# Patient Record
Sex: MALE | Race: BLACK OR AFRICAN AMERICAN | Employment: FULL TIME | URBAN - METROPOLITAN AREA
[De-identification: names, ages, dates, MRNs, and addresses within clinical notes are randomized per-mention and may not be internally consistent; named-entity substitution may affect disease eponyms.]

---

## 2018-01-01 ENCOUNTER — APPOINTMENT (OUTPATIENT)
Dept: GENERAL RADIOLOGY | Age: 53
DRG: 064 | End: 2018-01-01
Attending: INTERNAL MEDICINE
Payer: COMMERCIAL

## 2018-01-01 ENCOUNTER — APPOINTMENT (OUTPATIENT)
Dept: GENERAL RADIOLOGY | Age: 53
End: 2018-01-01
Attending: EMERGENCY MEDICINE
Payer: COMMERCIAL

## 2018-01-01 ENCOUNTER — APPOINTMENT (OUTPATIENT)
Dept: CT IMAGING | Age: 53
DRG: 064 | End: 2018-01-01
Attending: INTERNAL MEDICINE
Payer: COMMERCIAL

## 2018-01-01 ENCOUNTER — HOSPITAL ENCOUNTER (EMERGENCY)
Age: 53
Discharge: HOME | End: 2018-08-19
Attending: EMERGENCY MEDICINE
Payer: COMMERCIAL

## 2018-01-01 ENCOUNTER — HOSPITAL ENCOUNTER (OUTPATIENT)
Dept: SLEEP MEDICINE | Age: 53
Discharge: HOME | End: 2018-10-26
Payer: COMMERCIAL

## 2018-01-01 ENCOUNTER — APPOINTMENT (OUTPATIENT)
Dept: GENERAL RADIOLOGY | Age: 53
DRG: 064 | End: 2018-01-01
Payer: COMMERCIAL

## 2018-01-01 ENCOUNTER — APPOINTMENT (OUTPATIENT)
Dept: CT IMAGING | Age: 53
DRG: 064 | End: 2018-01-01
Payer: COMMERCIAL

## 2018-01-01 ENCOUNTER — HOSPITAL ENCOUNTER (INPATIENT)
Age: 53
LOS: 3 days | DRG: 064 | End: 2018-11-05
Admitting: INTERNAL MEDICINE
Payer: COMMERCIAL

## 2018-01-01 ENCOUNTER — HOSPITAL ENCOUNTER (OUTPATIENT)
Dept: ULTRASOUND IMAGING | Age: 53
Discharge: HOME | End: 2018-06-18
Attending: INTERNAL MEDICINE
Payer: COMMERCIAL

## 2018-01-01 VITALS
WEIGHT: 315 LBS | TEMPERATURE: 94.4 F | SYSTOLIC BLOOD PRESSURE: 93 MMHG | OXYGEN SATURATION: 36 % | BODY MASS INDEX: 52.65 KG/M2 | DIASTOLIC BLOOD PRESSURE: 65 MMHG | RESPIRATION RATE: 27 BRPM

## 2018-01-01 VITALS
TEMPERATURE: 98 F | HEIGHT: 71 IN | HEART RATE: 68 BPM | BODY MASS INDEX: 44.1 KG/M2 | WEIGHT: 315 LBS | OXYGEN SATURATION: 96 % | SYSTOLIC BLOOD PRESSURE: 189 MMHG | DIASTOLIC BLOOD PRESSURE: 85 MMHG | RESPIRATION RATE: 16 BRPM

## 2018-01-01 DIAGNOSIS — N17.9 AKI (ACUTE KIDNEY INJURY) (HCC): ICD-10-CM

## 2018-01-01 DIAGNOSIS — I16.1 HYPERTENSIVE EMERGENCY: ICD-10-CM

## 2018-01-01 DIAGNOSIS — J96.02 ACUTE RESPIRATORY FAILURE WITH HYPOXIA AND HYPERCARBIA (HCC): ICD-10-CM

## 2018-01-01 DIAGNOSIS — I61.9 HEMORRHAGIC CEREBROVASCULAR ACCIDENT (CVA) (HCC): Primary | ICD-10-CM

## 2018-01-01 DIAGNOSIS — I61.9 HEMORRHAGIC STROKE (HCC): ICD-10-CM

## 2018-01-01 DIAGNOSIS — R73.9 HYPERGLYCEMIA: ICD-10-CM

## 2018-01-01 DIAGNOSIS — M25.552 PAIN OF LEFT HIP JOINT: Primary | ICD-10-CM

## 2018-01-01 DIAGNOSIS — N18.30 CHRONIC KIDNEY DISEASE, STAGE III (MODERATE) (HCC): ICD-10-CM

## 2018-01-01 DIAGNOSIS — I10 ESSENTIAL HYPERTENSION: ICD-10-CM

## 2018-01-01 DIAGNOSIS — R39.81 FUNCTIONAL URINARY INCONTINENCE: ICD-10-CM

## 2018-01-01 DIAGNOSIS — E66.01 MORBID OBESITY (HCC): ICD-10-CM

## 2018-01-01 DIAGNOSIS — Z99.11 ENCOUNTER FOR WEANING FROM VENTILATOR (HCC): ICD-10-CM

## 2018-01-01 DIAGNOSIS — J96.01 ACUTE RESPIRATORY FAILURE WITH HYPOXIA AND HYPERCARBIA (HCC): ICD-10-CM

## 2018-01-01 LAB
ALBUMIN SERPL-MCNC: 2.4 G/DL (ref 3.5–5)
ALBUMIN SERPL-MCNC: 3.7 G/DL (ref 3.5–5)
ALBUMIN/GLOB SERPL: 0.6 {RATIO} (ref 1.2–3.5)
ALBUMIN/GLOB SERPL: 0.9 {RATIO} (ref 1.2–3.5)
ALP SERPL-CCNC: 62 U/L (ref 50–136)
ALP SERPL-CCNC: 78 U/L (ref 50–136)
ALT SERPL-CCNC: 16 U/L (ref 12–65)
ALT SERPL-CCNC: 25 U/L (ref 12–65)
AMPHET UR QL SCN: NEGATIVE
ANION GAP SERPL CALC-SCNC: 10 MMOL/L (ref 7–16)
ANION GAP SERPL CALC-SCNC: 8 MMOL/L (ref 7–16)
ANION GAP SERPL CALC-SCNC: 8 MMOL/L (ref 7–16)
ANION GAP SERPL CALC-SCNC: 9 MMOL/L (ref 7–16)
ANION GAP SERPL CALC-SCNC: 9 MMOL/L (ref 7–16)
APTT PPP: 29.6 SEC (ref 23.2–35.3)
APTT PPP: 31.3 SEC (ref 23.2–35.3)
ARTERIAL PATENCY WRIST A: NO
ARTERIAL PATENCY WRIST A: YES
AST SERPL-CCNC: 12 U/L (ref 15–37)
AST SERPL-CCNC: 13 U/L (ref 15–37)
ATRIAL RATE: 64 BPM
BARBITURATES UR QL SCN: NEGATIVE
BASE EXCESS BLD CALC-SCNC: 0 MMOL/L
BASE EXCESS BLD CALC-SCNC: 0 MMOL/L
BASE EXCESS BLD CALC-SCNC: 1 MMOL/L
BASE EXCESS BLD CALC-SCNC: 1 MMOL/L
BASE EXCESS BLD CALC-SCNC: 2 MMOL/L
BASE EXCESS BLD CALC-SCNC: 3 MMOL/L
BASE EXCESS BLD CALC-SCNC: 4 MMOL/L
BASOPHILS # BLD: 0 K/UL (ref 0–0.2)
BASOPHILS # BLD: 0 K/UL (ref 0–0.2)
BASOPHILS NFR BLD: 0 % (ref 0–2)
BASOPHILS NFR BLD: 1 % (ref 0–2)
BDY SITE: ABNORMAL
BENZODIAZ UR QL: NEGATIVE
BILIRUB SERPL-MCNC: 0.4 MG/DL (ref 0.2–1.1)
BILIRUB SERPL-MCNC: 1.9 MG/DL (ref 0.2–1.1)
BNP SERPL-MCNC: 103 PG/ML
BODY TEMPERATURE: 98.6
BUN SERPL-MCNC: 22 MG/DL (ref 6–23)
BUN SERPL-MCNC: 26 MG/DL (ref 6–23)
BUN SERPL-MCNC: 35 MG/DL (ref 6–23)
BUN SERPL-MCNC: 43 MG/DL (ref 6–23)
BUN SERPL-MCNC: 47 MG/DL (ref 6–23)
CALCIUM SERPL-MCNC: 7.8 MG/DL (ref 8.3–10.4)
CALCIUM SERPL-MCNC: 8 MG/DL (ref 8.3–10.4)
CALCIUM SERPL-MCNC: 8.2 MG/DL (ref 8.3–10.4)
CALCIUM SERPL-MCNC: 8.3 MG/DL (ref 8.3–10.4)
CALCIUM SERPL-MCNC: 8.5 MG/DL (ref 8.3–10.4)
CALCULATED P AXIS, ECG09: 52 DEGREES
CALCULATED R AXIS, ECG10: -44 DEGREES
CALCULATED T AXIS, ECG11: 89 DEGREES
CANNABINOIDS UR QL SCN: NEGATIVE
CHLORIDE SERPL-SCNC: 105 MMOL/L (ref 98–107)
CHLORIDE SERPL-SCNC: 106 MMOL/L (ref 98–107)
CHLORIDE SERPL-SCNC: 106 MMOL/L (ref 98–107)
CHLORIDE SERPL-SCNC: 107 MMOL/L (ref 98–107)
CHLORIDE SERPL-SCNC: 115 MMOL/L (ref 98–107)
CO2 BLD-SCNC: 25 MMOL/L
CO2 BLD-SCNC: 25 MMOL/L
CO2 BLD-SCNC: 26 MMOL/L
CO2 BLD-SCNC: 28 MMOL/L
CO2 BLD-SCNC: 28 MMOL/L
CO2 BLD-SCNC: 29 MMOL/L
CO2 BLD-SCNC: 29 MMOL/L
CO2 BLD-SCNC: 30 MMOL/L
CO2 BLD-SCNC: 30 MMOL/L
CO2 BLD-SCNC: 31 MMOL/L
CO2 SERPL-SCNC: 26 MMOL/L (ref 21–32)
CO2 SERPL-SCNC: 26 MMOL/L (ref 21–32)
CO2 SERPL-SCNC: 27 MMOL/L (ref 21–32)
CO2 SERPL-SCNC: 27 MMOL/L (ref 21–32)
CO2 SERPL-SCNC: 28 MMOL/L (ref 21–32)
COCAINE UR QL SCN: NEGATIVE
CREAT SERPL-MCNC: 2.59 MG/DL (ref 0.8–1.5)
CREAT SERPL-MCNC: 3.09 MG/DL (ref 0.8–1.5)
CREAT SERPL-MCNC: 3.56 MG/DL (ref 0.8–1.5)
CREAT SERPL-MCNC: 3.99 MG/DL (ref 0.8–1.5)
CREAT SERPL-MCNC: 4.07 MG/DL (ref 0.8–1.5)
DIAGNOSIS, 93000: NORMAL
DIFFERENTIAL METHOD BLD: ABNORMAL
DIFFERENTIAL METHOD BLD: NORMAL
EOSINOPHIL # BLD: 0.1 K/UL (ref 0–0.8)
EOSINOPHIL # BLD: 0.2 K/UL (ref 0–0.8)
EOSINOPHIL NFR BLD: 0 % (ref 0.5–7.8)
EOSINOPHIL NFR BLD: 3 % (ref 0.5–7.8)
ERYTHROCYTE [DISTWIDTH] IN BLOOD BY AUTOMATED COUNT: 13.9 %
ERYTHROCYTE [DISTWIDTH] IN BLOOD BY AUTOMATED COUNT: 14.4 %
ERYTHROCYTE [DISTWIDTH] IN BLOOD BY AUTOMATED COUNT: 14.6 %
ERYTHROCYTE [DISTWIDTH] IN BLOOD BY AUTOMATED COUNT: 14.6 %
EST. AVERAGE GLUCOSE BLD GHB EST-MCNC: 126 MG/DL
GAS FLOW.O2 O2 DELIVERY SYS: ABNORMAL L/MIN
GAS FLOW.O2 SETTING OXYMISER: 12 BPM
GAS FLOW.O2 SETTING OXYMISER: 14 BPM
GAS FLOW.O2 SETTING OXYMISER: 14 BPM
GAS FLOW.O2 SETTING OXYMISER: 18 BPM
GAS FLOW.O2 SETTING OXYMISER: 18 BPM
GAS FLOW.O2 SETTING OXYMISER: 22 BPM
GAS FLOW.O2 SETTING OXYMISER: 24 BPM
GAS FLOW.O2 SETTING OXYMISER: 24 BPM
GLOBULIN SER CALC-MCNC: 4.1 G/DL (ref 2.3–3.5)
GLOBULIN SER CALC-MCNC: 4.2 G/DL (ref 2.3–3.5)
GLUCOSE BLD STRIP.AUTO-MCNC: 105 MG/DL (ref 65–100)
GLUCOSE BLD STRIP.AUTO-MCNC: 108 MG/DL (ref 65–100)
GLUCOSE BLD STRIP.AUTO-MCNC: 108 MG/DL (ref 65–100)
GLUCOSE BLD STRIP.AUTO-MCNC: 120 MG/DL (ref 65–100)
GLUCOSE BLD STRIP.AUTO-MCNC: 120 MG/DL (ref 65–100)
GLUCOSE BLD STRIP.AUTO-MCNC: 128 MG/DL (ref 65–100)
GLUCOSE BLD STRIP.AUTO-MCNC: 129 MG/DL (ref 65–100)
GLUCOSE BLD STRIP.AUTO-MCNC: 131 MG/DL (ref 65–100)
GLUCOSE BLD STRIP.AUTO-MCNC: 139 MG/DL (ref 65–100)
GLUCOSE BLD STRIP.AUTO-MCNC: 147 MG/DL (ref 65–100)
GLUCOSE BLD STRIP.AUTO-MCNC: 148 MG/DL (ref 65–100)
GLUCOSE BLD STRIP.AUTO-MCNC: 85 MG/DL (ref 65–100)
GLUCOSE BLD STRIP.AUTO-MCNC: 99 MG/DL (ref 65–100)
GLUCOSE SERPL-MCNC: 103 MG/DL (ref 65–100)
GLUCOSE SERPL-MCNC: 135 MG/DL (ref 65–100)
GLUCOSE SERPL-MCNC: 137 MG/DL (ref 65–100)
GLUCOSE SERPL-MCNC: 155 MG/DL (ref 65–100)
GLUCOSE SERPL-MCNC: 90 MG/DL (ref 65–100)
HBA1C MFR BLD: 6 % (ref 4.8–6)
HCO3 BLD-SCNC: 23.7 MMOL/L (ref 22–26)
HCO3 BLD-SCNC: 23.7 MMOL/L (ref 22–26)
HCO3 BLD-SCNC: 24.6 MMOL/L (ref 22–26)
HCO3 BLD-SCNC: 26.7 MMOL/L (ref 22–26)
HCO3 BLD-SCNC: 27.1 MMOL/L (ref 22–26)
HCO3 BLD-SCNC: 27.3 MMOL/L (ref 22–26)
HCO3 BLD-SCNC: 27.9 MMOL/L (ref 22–26)
HCO3 BLD-SCNC: 28 MMOL/L (ref 22–26)
HCO3 BLD-SCNC: 28.4 MMOL/L (ref 22–26)
HCO3 BLD-SCNC: 28.7 MMOL/L (ref 22–26)
HCT VFR BLD AUTO: 40.8 % (ref 41.1–50.3)
HCT VFR BLD AUTO: 41.5 % (ref 41.1–50.3)
HCT VFR BLD AUTO: 44.8 % (ref 41.1–50.3)
HCT VFR BLD AUTO: 47.4 % (ref 41.1–50.3)
HGB BLD-MCNC: 13 G/DL (ref 13.6–17.2)
HGB BLD-MCNC: 13.3 G/DL (ref 13.6–17.2)
HGB BLD-MCNC: 14.5 G/DL (ref 13.6–17.2)
HGB BLD-MCNC: 15.1 G/DL (ref 13.6–17.2)
IMM GRANULOCYTES # BLD: 0 K/UL (ref 0–0.5)
IMM GRANULOCYTES # BLD: 0.1 K/UL (ref 0–0.5)
IMM GRANULOCYTES NFR BLD AUTO: 0 % (ref 0–5)
IMM GRANULOCYTES NFR BLD AUTO: 0 % (ref 0–5)
INR PPP: 1.1
INR PPP: 1.3
INR PPP: 1.3
INSPIRATION.DURATION SETTING TIME VENT: 0.9 SEC
INSPIRATION.DURATION SETTING TIME VENT: 1.3 SEC
INSPIRATION.DURATION SETTING TIME VENT: 1.3 SEC
LYMPHOCYTES # BLD: 1.1 K/UL (ref 0.5–4.6)
LYMPHOCYTES # BLD: 2.3 K/UL (ref 0.5–4.6)
LYMPHOCYTES NFR BLD: 32 % (ref 13–44)
LYMPHOCYTES NFR BLD: 9 % (ref 13–44)
MAGNESIUM SERPL-MCNC: 2.1 MG/DL (ref 1.8–2.4)
MAGNESIUM SERPL-MCNC: 2.1 MG/DL (ref 1.8–2.4)
MAGNESIUM SERPL-MCNC: 2.2 MG/DL (ref 1.8–2.4)
MCH RBC QN AUTO: 29.1 PG (ref 26.1–32.9)
MCH RBC QN AUTO: 29.6 PG (ref 26.1–32.9)
MCH RBC QN AUTO: 29.7 PG (ref 26.1–32.9)
MCH RBC QN AUTO: 29.8 PG (ref 26.1–32.9)
MCHC RBC AUTO-ENTMCNC: 31.9 G/DL (ref 31.4–35)
MCHC RBC AUTO-ENTMCNC: 31.9 G/DL (ref 31.4–35)
MCHC RBC AUTO-ENTMCNC: 32 G/DL (ref 31.4–35)
MCHC RBC AUTO-ENTMCNC: 32.4 G/DL (ref 31.4–35)
MCV RBC AUTO: 91.4 FL (ref 79.6–97.8)
MCV RBC AUTO: 91.5 FL (ref 79.6–97.8)
MCV RBC AUTO: 92.6 FL (ref 79.6–97.8)
MCV RBC AUTO: 93.5 FL (ref 79.6–97.8)
METHADONE UR QL: NEGATIVE
MM INDURATION POC: 0 MM (ref 0–5)
MM INDURATION POC: 0 MM (ref 0–5)
MM INDURATION POC: NORMAL MM (ref 0–5)
MONOCYTES # BLD: 0.6 K/UL (ref 0.1–1.3)
MONOCYTES # BLD: 0.9 K/UL (ref 0.1–1.3)
MONOCYTES NFR BLD: 7 % (ref 4–12)
MONOCYTES NFR BLD: 8 % (ref 4–12)
NEUTS SEG # BLD: 10.3 K/UL (ref 1.7–8.2)
NEUTS SEG # BLD: 4 K/UL (ref 1.7–8.2)
NEUTS SEG NFR BLD: 56 % (ref 43–78)
NEUTS SEG NFR BLD: 83 % (ref 43–78)
NRBC # BLD: 0 K/UL (ref 0–0.2)
O2/TOTAL GAS SETTING VFR VENT: 100 %
O2/TOTAL GAS SETTING VFR VENT: 21 %
O2/TOTAL GAS SETTING VFR VENT: 45 %
O2/TOTAL GAS SETTING VFR VENT: 60 %
O2/TOTAL GAS SETTING VFR VENT: 70 %
OPIATES UR QL: POSITIVE
P-R INTERVAL, ECG05: 212 MS
PCO2 BLD: 28.4 MMHG (ref 35–45)
PCO2 BLD: 30.1 MMHG (ref 35–45)
PCO2 BLD: 33.1 MMHG (ref 35–45)
PCO2 BLD: 34.2 MMHG (ref 35–45)
PCO2 BLD: 40.9 MMHG (ref 35–45)
PCO2 BLD: 44.6 MMHG (ref 35–45)
PCO2 BLD: 49.2 MMHG (ref 35–45)
PCO2 BLD: 54.5 MMHG (ref 35–45)
PCO2 BLD: 60.4 MMHG (ref 35–45)
PCO2 BLD: 62.2 MMHG (ref 35–45)
PCP UR QL: NEGATIVE
PEEP RESPIRATORY: 12 CMH2O
PEEP RESPIRATORY: 14 CMH2O
PEEP RESPIRATORY: 8 CMH2O
PEEP RESPIRATORY: 8 CMH2O
PH BLD: 7.27 [PH] (ref 7.35–7.45)
PH BLD: 7.27 [PH] (ref 7.35–7.45)
PH BLD: 7.33 [PH] (ref 7.35–7.45)
PH BLD: 7.36 [PH] (ref 7.35–7.45)
PH BLD: 7.39 [PH] (ref 7.35–7.45)
PH BLD: 7.43 [PH] (ref 7.35–7.45)
PH BLD: 7.48 [PH] (ref 7.35–7.45)
PH BLD: 7.5 [PH] (ref 7.35–7.45)
PH BLD: 7.5 [PH] (ref 7.35–7.45)
PH BLD: 7.53 [PH] (ref 7.35–7.45)
PHOSPHATE SERPL-MCNC: 1.6 MG/DL (ref 2.5–4.5)
PHOSPHATE SERPL-MCNC: 3.9 MG/DL (ref 2.5–4.5)
PLATELET # BLD AUTO: 177 K/UL (ref 150–450)
PLATELET # BLD AUTO: 210 K/UL (ref 150–450)
PLATELET # BLD AUTO: 226 K/UL (ref 150–450)
PLATELET # BLD AUTO: 232 K/UL (ref 150–450)
PMV BLD AUTO: 11.2 FL (ref 9.4–12.3)
PMV BLD AUTO: 11.2 FL (ref 9.4–12.3)
PMV BLD AUTO: 11.6 FL (ref 9.4–12.3)
PMV BLD AUTO: 11.7 FL (ref 9.4–12.3)
PO2 BLD: 102 MMHG (ref 75–100)
PO2 BLD: 125 MMHG (ref 75–100)
PO2 BLD: 196 MMHG (ref 75–100)
PO2 BLD: 46 MMHG (ref 75–100)
PO2 BLD: 68 MMHG (ref 75–100)
PO2 BLD: 76 MMHG (ref 75–100)
PO2 BLD: 76 MMHG (ref 75–100)
PO2 BLD: 78 MMHG (ref 75–100)
PO2 BLD: 80 MMHG (ref 75–100)
PO2 BLD: 99 MMHG (ref 75–100)
POTASSIUM SERPL-SCNC: 3.5 MMOL/L (ref 3.5–5.1)
POTASSIUM SERPL-SCNC: 3.8 MMOL/L (ref 3.5–5.1)
POTASSIUM SERPL-SCNC: 3.9 MMOL/L (ref 3.5–5.1)
PPD POC: NEGATIVE NEGATIVE
PPD POC: NORMAL NEGATIVE
PPD POC: NORMAL NEGATIVE
PROCALCITONIN SERPL-MCNC: 0.1 NG/ML
PROT SERPL-MCNC: 6.5 G/DL (ref 6.3–8.2)
PROT SERPL-MCNC: 7.9 G/DL (ref 6.3–8.2)
PROTHROMBIN TIME: 13.4 SEC (ref 11.5–14.5)
PROTHROMBIN TIME: 15.3 SEC (ref 11.5–14.5)
PROTHROMBIN TIME: 15.4 SEC (ref 11.5–14.5)
Q-T INTERVAL, ECG07: 434 MS
QRS DURATION, ECG06: 102 MS
QTC CALCULATION (BEZET), ECG08: 447 MS
RBC # BLD AUTO: 4.46 M/UL (ref 4.23–5.6)
RBC # BLD AUTO: 4.48 M/UL (ref 4.23–5.6)
RBC # BLD AUTO: 4.9 M/UL (ref 4.23–5.6)
RBC # BLD AUTO: 5.07 M/UL (ref 4.23–5.6)
SAO2 % BLD: 100 % (ref 95–98)
SAO2 % BLD: 74 % (ref 95–98)
SAO2 % BLD: 94 % (ref 95–98)
SAO2 % BLD: 95 % (ref 95–98)
SAO2 % BLD: 96 % (ref 95–98)
SAO2 % BLD: 96 % (ref 95–98)
SAO2 % BLD: 97 % (ref 95–98)
SAO2 % BLD: 99 % (ref 95–98)
SERVICE CMNT-IMP: ABNORMAL
SODIUM SERPL-SCNC: 140 MMOL/L (ref 136–145)
SODIUM SERPL-SCNC: 142 MMOL/L (ref 136–145)
SODIUM SERPL-SCNC: 142 MMOL/L (ref 136–145)
SODIUM SERPL-SCNC: 143 MMOL/L (ref 136–145)
SODIUM SERPL-SCNC: 150 MMOL/L (ref 136–145)
SPECIMEN TYPE: ABNORMAL
VENTILATION MODE VENT: ABNORMAL
VENTRICULAR RATE, ECG03: 64 BPM
VT SETTING VENT: 500 ML
VT SETTING VENT: 510 ML
WBC # BLD AUTO: 12.5 K/UL (ref 4.3–11.1)
WBC # BLD AUTO: 14.1 K/UL (ref 4.3–11.1)
WBC # BLD AUTO: 7.3 K/UL (ref 4.3–11.1)
WBC # BLD AUTO: 8.2 K/UL (ref 4.3–11.1)

## 2018-01-01 PROCEDURE — 80053 COMPREHEN METABOLIC PANEL: CPT

## 2018-01-01 PROCEDURE — 80048 BASIC METABOLIC PNL TOTAL CA: CPT

## 2018-01-01 PROCEDURE — 71045 X-RAY EXAM CHEST 1 VIEW: CPT

## 2018-01-01 PROCEDURE — 36415 COLL VENOUS BLD VENIPUNCTURE: CPT

## 2018-01-01 PROCEDURE — 74011250637 HC RX REV CODE- 250/637: Performed by: INTERNAL MEDICINE

## 2018-01-01 PROCEDURE — 74011000250 HC RX REV CODE- 250: Performed by: INTERNAL MEDICINE

## 2018-01-01 PROCEDURE — 05HM33Z INSERTION OF INFUSION DEVICE INTO RIGHT INTERNAL JUGULAR VEIN, PERCUTANEOUS APPROACH: ICD-10-PCS | Performed by: INTERNAL MEDICINE

## 2018-01-01 PROCEDURE — 36600 WITHDRAWAL OF ARTERIAL BLOOD: CPT

## 2018-01-01 PROCEDURE — 65610000001 HC ROOM ICU GENERAL

## 2018-01-01 PROCEDURE — 74011000258 HC RX REV CODE- 258: Performed by: INTERNAL MEDICINE

## 2018-01-01 PROCEDURE — 83735 ASSAY OF MAGNESIUM: CPT

## 2018-01-01 PROCEDURE — 99285 EMERGENCY DEPT VISIT HI MDM: CPT

## 2018-01-01 PROCEDURE — C1751 CATH, INF, PER/CENT/MIDLINE: HCPCS

## 2018-01-01 PROCEDURE — 74011250637 HC RX REV CODE- 250/637: Performed by: FAMILY MEDICINE

## 2018-01-01 PROCEDURE — 94002 VENT MGMT INPAT INIT DAY: CPT

## 2018-01-01 PROCEDURE — 70450 CT HEAD/BRAIN W/O DYE: CPT

## 2018-01-01 PROCEDURE — 77030020263 HC SOL INJ SOD CL0.9% LFCR 1000ML

## 2018-01-01 PROCEDURE — C9113 INJ PANTOPRAZOLE SODIUM, VIA: HCPCS | Performed by: INTERNAL MEDICINE

## 2018-01-01 PROCEDURE — 74011250636 HC RX REV CODE- 250/636

## 2018-01-01 PROCEDURE — 74011250636 HC RX REV CODE- 250/636: Performed by: INTERNAL MEDICINE

## 2018-01-01 PROCEDURE — 76770 US EXAM ABDO BACK WALL COMP: CPT

## 2018-01-01 PROCEDURE — 84145 PROCALCITONIN (PCT): CPT

## 2018-01-01 PROCEDURE — 85025 COMPLETE CBC W/AUTO DIFF WBC: CPT

## 2018-01-01 PROCEDURE — 99223 1ST HOSP IP/OBS HIGH 75: CPT | Performed by: INTERNAL MEDICINE

## 2018-01-01 PROCEDURE — 99239 HOSP IP/OBS DSCHRG MGMT >30: CPT | Performed by: INTERNAL MEDICINE

## 2018-01-01 PROCEDURE — 85730 THROMBOPLASTIN TIME PARTIAL: CPT

## 2018-01-01 PROCEDURE — 84100 ASSAY OF PHOSPHORUS: CPT

## 2018-01-01 PROCEDURE — 31500 INSERT EMERGENCY AIRWAY: CPT

## 2018-01-01 PROCEDURE — 96375 TX/PRO/DX INJ NEW DRUG ADDON: CPT

## 2018-01-01 PROCEDURE — 85610 PROTHROMBIN TIME: CPT

## 2018-01-01 PROCEDURE — 82962 GLUCOSE BLOOD TEST: CPT

## 2018-01-01 PROCEDURE — 86580 TB INTRADERMAL TEST: CPT | Performed by: INTERNAL MEDICINE

## 2018-01-01 PROCEDURE — 74011000302 HC RX REV CODE- 302: Performed by: INTERNAL MEDICINE

## 2018-01-01 PROCEDURE — 73502 X-RAY EXAM HIP UNI 2-3 VIEWS: CPT

## 2018-01-01 PROCEDURE — 83036 HEMOGLOBIN GLYCOSYLATED A1C: CPT

## 2018-01-01 PROCEDURE — 85027 COMPLETE CBC AUTOMATED: CPT

## 2018-01-01 PROCEDURE — 99291 CRITICAL CARE FIRST HOUR: CPT | Performed by: INTERNAL MEDICINE

## 2018-01-01 PROCEDURE — 99283 EMERGENCY DEPT VISIT LOW MDM: CPT | Performed by: EMERGENCY MEDICINE

## 2018-01-01 PROCEDURE — 0BH17EZ INSERTION OF ENDOTRACHEAL AIRWAY INTO TRACHEA, VIA NATURAL OR ARTIFICIAL OPENING: ICD-10-PCS

## 2018-01-01 PROCEDURE — 82803 BLOOD GASES ANY COMBINATION: CPT

## 2018-01-01 PROCEDURE — 31622 DX BRONCHOSCOPE/WASH: CPT | Performed by: INTERNAL MEDICINE

## 2018-01-01 PROCEDURE — 94003 VENT MGMT INPAT SUBQ DAY: CPT

## 2018-01-01 PROCEDURE — 99223 1ST HOSP IP/OBS HIGH 75: CPT | Performed by: PSYCHIATRY & NEUROLOGY

## 2018-01-01 PROCEDURE — 93005 ELECTROCARDIOGRAM TRACING: CPT | Performed by: FAMILY MEDICINE

## 2018-01-01 PROCEDURE — 77030037378 HC BRONCHOSCOPE DISP TRAN -D

## 2018-01-01 PROCEDURE — 83880 ASSAY OF NATRIURETIC PEPTIDE: CPT

## 2018-01-01 PROCEDURE — 77030034850

## 2018-01-01 PROCEDURE — 0BJ08ZZ INSPECTION OF TRACHEOBRONCHIAL TREE, VIA NATURAL OR ARTIFICIAL OPENING ENDOSCOPIC: ICD-10-PCS | Performed by: INTERNAL MEDICINE

## 2018-01-01 PROCEDURE — 5A1945Z RESPIRATORY VENTILATION, 24-96 CONSECUTIVE HOURS: ICD-10-PCS

## 2018-01-01 PROCEDURE — 96365 THER/PROPH/DIAG IV INF INIT: CPT

## 2018-01-01 PROCEDURE — 95811 POLYSOM 6/>YRS CPAP 4/> PARM: CPT

## 2018-01-01 PROCEDURE — 77030032490 HC SLV COMPR SCD KNE COVD -B

## 2018-01-01 PROCEDURE — 77030018798 HC PMP KT ENTRL FED COVD -A

## 2018-01-01 PROCEDURE — 76450000000

## 2018-01-01 PROCEDURE — 74011000250 HC RX REV CODE- 250

## 2018-01-01 PROCEDURE — 80307 DRUG TEST PRSMV CHEM ANLYZR: CPT

## 2018-01-01 PROCEDURE — 36556 INSERT NON-TUNNEL CV CATH: CPT | Performed by: INTERNAL MEDICINE

## 2018-01-01 PROCEDURE — 99233 SBSQ HOSP IP/OBS HIGH 50: CPT | Performed by: PSYCHIATRY & NEUROLOGY

## 2018-01-01 PROCEDURE — 96368 THER/DIAG CONCURRENT INF: CPT

## 2018-01-01 PROCEDURE — 99233 SBSQ HOSP IP/OBS HIGH 50: CPT | Performed by: INTERNAL MEDICINE

## 2018-01-01 PROCEDURE — 77030020252 HC SOL INJ D5 SOD CL 0.225%1000ML

## 2018-01-01 PROCEDURE — 77030020186 HC BOOT HL PROTCT SAGE -B

## 2018-01-01 PROCEDURE — 74018 RADEX ABDOMEN 1 VIEW: CPT

## 2018-01-01 PROCEDURE — 36592 COLLECT BLOOD FROM PICC: CPT

## 2018-01-01 RX ORDER — MINOXIDIL 2.5 MG/1
5 TABLET ORAL 2 TIMES DAILY
COMMUNITY

## 2018-01-01 RX ORDER — NITROGLYCERIN 20 MG/100ML
0-200 INJECTION INTRAVENOUS
Status: DISCONTINUED | OUTPATIENT
Start: 2018-01-01 | End: 2018-01-01

## 2018-01-01 RX ORDER — ACETAMINOPHEN AND CODEINE PHOSPHATE 300; 30 MG/1; MG/1
1 TABLET ORAL
COMMUNITY

## 2018-01-01 RX ORDER — FENTANYL CITRATE 50 UG/ML
50 INJECTION, SOLUTION INTRAMUSCULAR; INTRAVENOUS ONCE
Status: COMPLETED | OUTPATIENT
Start: 2018-01-01 | End: 2018-01-01

## 2018-01-01 RX ORDER — FUROSEMIDE 10 MG/ML
100 INJECTION INTRAMUSCULAR; INTRAVENOUS EVERY 12 HOURS
Status: DISCONTINUED | OUTPATIENT
Start: 2018-01-01 | End: 2018-01-01

## 2018-01-01 RX ORDER — TRAMADOL HYDROCHLORIDE 50 MG/1
50 TABLET ORAL
Qty: 20 TAB | Refills: 0 | Status: ON HOLD | OUTPATIENT
Start: 2018-01-01 | End: 2018-01-01

## 2018-01-01 RX ORDER — SODIUM CHLORIDE 9 MG/ML
50 INJECTION, SOLUTION INTRAVENOUS CONTINUOUS
Status: DISCONTINUED | OUTPATIENT
Start: 2018-01-01 | End: 2018-01-01

## 2018-01-01 RX ORDER — CLONIDINE HYDROCHLORIDE 0.2 MG/1
0.2 TABLET ORAL
COMMUNITY

## 2018-01-01 RX ORDER — SODIUM CHLORIDE 0.9 % (FLUSH) 0.9 %
5-10 SYRINGE (ML) INJECTION AS NEEDED
Status: DISCONTINUED | OUTPATIENT
Start: 2018-01-01 | End: 2018-01-01 | Stop reason: HOSPADM

## 2018-01-01 RX ORDER — SODIUM CHLORIDE 0.9 % (FLUSH) 0.9 %
5-10 SYRINGE (ML) INJECTION EVERY 8 HOURS
Status: DISCONTINUED | OUTPATIENT
Start: 2018-01-01 | End: 2018-01-01

## 2018-01-01 RX ORDER — MORPHINE SULFATE 4 MG/ML
4 INJECTION INTRAVENOUS ONCE
Status: COMPLETED | OUTPATIENT
Start: 2018-01-01 | End: 2018-01-01

## 2018-01-01 RX ORDER — VECURONIUM BROMIDE FOR INJECTION 1 MG/ML
0.1 INJECTION, POWDER, LYOPHILIZED, FOR SOLUTION INTRAVENOUS
Status: COMPLETED | OUTPATIENT
Start: 2018-01-01 | End: 2018-01-01

## 2018-01-01 RX ORDER — FUROSEMIDE 40 MG/1
40 TABLET ORAL DAILY
COMMUNITY

## 2018-01-01 RX ORDER — CHOLECALCIFEROL (VITAMIN D3) 125 MCG
2000 CAPSULE ORAL DAILY
COMMUNITY

## 2018-01-01 RX ORDER — SUCCINYLCHOLINE CHLORIDE 20 MG/ML
200 INJECTION INTRAMUSCULAR; INTRAVENOUS
Status: COMPLETED | OUTPATIENT
Start: 2018-01-01 | End: 2018-01-01

## 2018-01-01 RX ORDER — LABETALOL HYDROCHLORIDE 5 MG/ML
20 INJECTION, SOLUTION INTRAVENOUS
Status: COMPLETED | OUTPATIENT
Start: 2018-01-01 | End: 2018-01-01

## 2018-01-01 RX ORDER — GLYCOPYRROLATE 0.2 MG/ML
0.1 INJECTION INTRAMUSCULAR; INTRAVENOUS
Status: DISCONTINUED | OUTPATIENT
Start: 2018-01-01 | End: 2018-01-01 | Stop reason: HOSPADM

## 2018-01-01 RX ORDER — 3% SODIUM CHLORIDE 3 G/100ML
30 INJECTION, SOLUTION INTRAVENOUS CONTINUOUS
Status: DISCONTINUED | OUTPATIENT
Start: 2018-01-01 | End: 2018-01-01

## 2018-01-01 RX ORDER — LOSARTAN POTASSIUM 100 MG/1
100 TABLET ORAL DAILY
COMMUNITY

## 2018-01-01 RX ORDER — AMLODIPINE BESYLATE 5 MG/1
5 TABLET ORAL 2 TIMES DAILY
Status: DISCONTINUED | OUTPATIENT
Start: 2018-01-01 | End: 2018-01-01

## 2018-01-01 RX ORDER — MANNITOL 250 MG/ML
75 INJECTION, SOLUTION INTRAVENOUS ONCE
Status: COMPLETED | OUTPATIENT
Start: 2018-01-01 | End: 2018-01-01

## 2018-01-01 RX ORDER — HYDRALAZINE HYDROCHLORIDE 20 MG/ML
10 INJECTION INTRAMUSCULAR; INTRAVENOUS
Status: DISCONTINUED | OUTPATIENT
Start: 2018-01-01 | End: 2018-01-01

## 2018-01-01 RX ORDER — ATENOLOL 50 MG/1
50 TABLET ORAL 2 TIMES DAILY
COMMUNITY

## 2018-01-01 RX ORDER — ETOMIDATE 2 MG/ML
0.15 INJECTION INTRAVENOUS ONCE
Status: DISCONTINUED | OUTPATIENT
Start: 2018-01-01 | End: 2018-01-01 | Stop reason: SDUPTHER

## 2018-01-01 RX ORDER — SIMVASTATIN 40 MG/1
40 TABLET, FILM COATED ORAL
Status: DISCONTINUED | OUTPATIENT
Start: 2018-01-01 | End: 2018-01-01

## 2018-01-01 RX ORDER — METOPROLOL TARTRATE 5 MG/5ML
5 INJECTION INTRAVENOUS
Status: DISCONTINUED | OUTPATIENT
Start: 2018-01-01 | End: 2018-01-01

## 2018-01-01 RX ORDER — SODIUM,POTASSIUM PHOSPHATES 280-250MG
1 POWDER IN PACKET (EA) ORAL EVERY 6 HOURS
Status: DISCONTINUED | OUTPATIENT
Start: 2018-01-01 | End: 2018-01-01

## 2018-01-01 RX ORDER — PROPOFOL 10 MG/ML
0-50 VIAL (ML) INTRAVENOUS
Status: DISCONTINUED | OUTPATIENT
Start: 2018-01-01 | End: 2018-01-01

## 2018-01-01 RX ORDER — ETOMIDATE 2 MG/ML
0.15 INJECTION INTRAVENOUS ONCE
Status: COMPLETED | OUTPATIENT
Start: 2018-01-01 | End: 2018-01-01

## 2018-01-01 RX ORDER — NOREPINEPHRINE BIT/0.9 % NACL 4MG/250ML
0-16 PLASTIC BAG, INJECTION (ML) INTRAVENOUS
Status: DISCONTINUED | OUTPATIENT
Start: 2018-01-01 | End: 2018-01-01

## 2018-01-01 RX ORDER — ONDANSETRON 2 MG/ML
4 INJECTION INTRAMUSCULAR; INTRAVENOUS
Status: COMPLETED | OUTPATIENT
Start: 2018-01-01 | End: 2018-01-01

## 2018-01-01 RX ORDER — FENTANYL CITRATE-0.9 % NACL/PF 25 MCG/ML
25-200 PLASTIC BAG, INJECTION (ML) INJECTION
Status: DISCONTINUED | OUTPATIENT
Start: 2018-01-01 | End: 2018-01-01

## 2018-01-01 RX ORDER — CLONIDINE 0.2 MG/24H
1 PATCH, EXTENDED RELEASE TRANSDERMAL
Status: DISCONTINUED | OUTPATIENT
Start: 2018-01-01 | End: 2018-01-01

## 2018-01-01 RX ORDER — HYDRALAZINE HYDROCHLORIDE 20 MG/ML
20 INJECTION INTRAMUSCULAR; INTRAVENOUS EVERY 6 HOURS
Status: DISCONTINUED | OUTPATIENT
Start: 2018-01-01 | End: 2018-01-01

## 2018-01-01 RX ADMIN — SODIUM CHLORIDE 40 MG: 9 INJECTION, SOLUTION INTRAMUSCULAR; INTRAVENOUS; SUBCUTANEOUS at 09:41

## 2018-01-01 RX ADMIN — NICARDIPINE HYDROCHLORIDE 5 MG/HR: 25 INJECTION INTRAVENOUS at 14:38

## 2018-01-01 RX ADMIN — Medication 50 MCG/HR: at 04:42

## 2018-01-01 RX ADMIN — Medication 10 ML: at 05:21

## 2018-01-01 RX ADMIN — AMLODIPINE BESYLATE 5 MG: 5 TABLET ORAL at 09:06

## 2018-01-01 RX ADMIN — PROPOFOL 20 MCG/KG/MIN: 10 INJECTION, EMULSION INTRAVENOUS at 23:48

## 2018-01-01 RX ADMIN — POTASSIUM & SODIUM PHOSPHATES POWDER PACK 280-160-250 MG 1 PACKET: 280-160-250 PACK at 06:10

## 2018-01-01 RX ADMIN — SODIUM CHLORIDE 15 MG/HR: 900 INJECTION, SOLUTION INTRAVENOUS at 08:18

## 2018-01-01 RX ADMIN — SODIUM CHLORIDE 40 MG: 9 INJECTION, SOLUTION INTRAMUSCULAR; INTRAVENOUS; SUBCUTANEOUS at 08:07

## 2018-01-01 RX ADMIN — HYDRALAZINE HYDROCHLORIDE 20 MG: 20 INJECTION INTRAMUSCULAR; INTRAVENOUS at 11:42

## 2018-01-01 RX ADMIN — PROPOFOL 15 MCG/KG/MIN: 10 INJECTION, EMULSION INTRAVENOUS at 05:14

## 2018-01-01 RX ADMIN — NICARDIPINE HYDROCHLORIDE 15 MG/HR: 25 INJECTION INTRAVENOUS at 20:36

## 2018-01-01 RX ADMIN — FUROSEMIDE 20 MG/HR: 10 INJECTION, SOLUTION INTRAMUSCULAR; INTRAVENOUS at 14:48

## 2018-01-01 RX ADMIN — POTASSIUM & SODIUM PHOSPHATES POWDER PACK 280-160-250 MG 1 PACKET: 280-160-250 PACK at 00:32

## 2018-01-01 RX ADMIN — LABETALOL HYDROCHLORIDE 20 MG: 5 INJECTION, SOLUTION INTRAVENOUS at 03:39

## 2018-01-01 RX ADMIN — Medication 10 ML: at 05:18

## 2018-01-01 RX ADMIN — NICARDIPINE HYDROCHLORIDE 5 MG/HR: 25 INJECTION INTRAVENOUS at 03:53

## 2018-01-01 RX ADMIN — FENTANYL CITRATE 50 MCG: 50 INJECTION INTRAMUSCULAR; INTRAVENOUS at 04:16

## 2018-01-01 RX ADMIN — Medication 10 ML: at 05:33

## 2018-01-01 RX ADMIN — SODIUM CHLORIDE 15 MG/HR: 900 INJECTION, SOLUTION INTRAVENOUS at 06:45

## 2018-01-01 RX ADMIN — TUBERCULIN PURIFIED PROTEIN DERIVATIVE 5 UNITS: 5 INJECTION, SOLUTION INTRADERMAL at 08:36

## 2018-01-01 RX ADMIN — SODIUM CHLORIDE 7.5 MG/HR: 900 INJECTION, SOLUTION INTRAVENOUS at 00:28

## 2018-01-01 RX ADMIN — MORPHINE SULFATE 4 MG: 4 INJECTION INTRAVENOUS at 03:44

## 2018-01-01 RX ADMIN — Medication 10 ML: at 14:50

## 2018-01-01 RX ADMIN — SODIUM CHLORIDE 50 ML/HR: 900 INJECTION, SOLUTION INTRAVENOUS at 00:37

## 2018-01-01 RX ADMIN — POTASSIUM & SODIUM PHOSPHATES POWDER PACK 280-160-250 MG 1 PACKET: 280-160-250 PACK at 17:10

## 2018-01-01 RX ADMIN — NICARDIPINE HYDROCHLORIDE 15 MG/HR: 25 INJECTION INTRAVENOUS at 07:45

## 2018-01-01 RX ADMIN — PROPOFOL 30 MCG/KG/MIN: 10 INJECTION, EMULSION INTRAVENOUS at 10:40

## 2018-01-01 RX ADMIN — FUROSEMIDE 20 MG/HR: 10 INJECTION, SOLUTION INTRAMUSCULAR; INTRAVENOUS at 01:01

## 2018-01-01 RX ADMIN — NICARDIPINE HYDROCHLORIDE 12.5 MG/HR: 25 INJECTION INTRAVENOUS at 22:15

## 2018-01-01 RX ADMIN — NICARDIPINE HYDROCHLORIDE 15 MG/HR: 25 INJECTION INTRAVENOUS at 17:25

## 2018-01-01 RX ADMIN — MINERAL OIL AND WHITE PETROLATUM: 150; 830 OINTMENT OPHTHALMIC at 04:00

## 2018-01-01 RX ADMIN — NITROGLYCERIN 50 MCG/MIN: 20 INJECTION INTRAVENOUS at 15:35

## 2018-01-01 RX ADMIN — PROPOFOL 15 MCG/KG/MIN: 10 INJECTION, EMULSION INTRAVENOUS at 13:01

## 2018-01-01 RX ADMIN — GLYCOPYRROLATE 0.1 MG: 0.2 INJECTION, SOLUTION INTRAMUSCULAR; INTRAVENOUS at 11:38

## 2018-01-01 RX ADMIN — Medication 10 ML: at 14:27

## 2018-01-01 RX ADMIN — MINERAL OIL AND WHITE PETROLATUM: 150; 830 OINTMENT OPHTHALMIC at 00:00

## 2018-01-01 RX ADMIN — PROPOFOL 25 MCG/KG/MIN: 10 INJECTION, EMULSION INTRAVENOUS at 04:41

## 2018-01-01 RX ADMIN — PROPOFOL 20 MCG/KG/MIN: 10 INJECTION, EMULSION INTRAVENOUS at 14:37

## 2018-01-01 RX ADMIN — NICARDIPINE HYDROCHLORIDE 15 MG/HR: 25 INJECTION INTRAVENOUS at 19:04

## 2018-01-01 RX ADMIN — ETOMIDATE 26.26 MG: 2 INJECTION, SOLUTION INTRAVENOUS at 04:27

## 2018-01-01 RX ADMIN — FUROSEMIDE 20 MG/HR: 10 INJECTION, SOLUTION INTRAMUSCULAR; INTRAVENOUS at 20:09

## 2018-01-01 RX ADMIN — MANNITOL 75 G: 250 INJECTION, SOLUTION INTRAVENOUS at 19:32

## 2018-01-01 RX ADMIN — NICARDIPINE HYDROCHLORIDE 5 MG/HR: 25 INJECTION INTRAVENOUS at 10:40

## 2018-01-01 RX ADMIN — Medication 10 ML: at 21:33

## 2018-01-01 RX ADMIN — SUCCINYLCHOLINE CHLORIDE 200 MG: 20 INJECTION, SOLUTION INTRAMUSCULAR; INTRAVENOUS at 04:27

## 2018-01-01 RX ADMIN — PROPOFOL 15 MCG/KG/MIN: 10 INJECTION, EMULSION INTRAVENOUS at 22:54

## 2018-01-01 RX ADMIN — SODIUM CHLORIDE 15 MG/HR: 900 INJECTION, SOLUTION INTRAVENOUS at 14:25

## 2018-01-01 RX ADMIN — VECURONIUM BROMIDE 17.5 MG: 1 INJECTION, POWDER, LYOPHILIZED, FOR SOLUTION INTRAVENOUS at 04:44

## 2018-01-01 RX ADMIN — HYDRALAZINE HYDROCHLORIDE 10 MG: 20 INJECTION INTRAMUSCULAR; INTRAVENOUS at 00:32

## 2018-01-01 RX ADMIN — SIMVASTATIN 40 MG: 40 TABLET, FILM COATED ORAL at 21:36

## 2018-01-01 RX ADMIN — NOREPINEPHRINE BITARTRATE 4 MCG/MIN: 1 INJECTION INTRAVENOUS at 18:23

## 2018-01-01 RX ADMIN — Medication 10 ML: at 13:01

## 2018-01-01 RX ADMIN — PROPOFOL 15 MCG/KG/MIN: 10 INJECTION, EMULSION INTRAVENOUS at 18:00

## 2018-01-01 RX ADMIN — ONDANSETRON 4 MG: 2 INJECTION, SOLUTION INTRAMUSCULAR; INTRAVENOUS at 03:45

## 2018-01-01 RX ADMIN — NITROGLYCERIN 5 MCG/MIN: 20 INJECTION INTRAVENOUS at 07:44

## 2018-01-01 RX ADMIN — SODIUM CHLORIDE 30 ML/HR: 3 INJECTION, SOLUTION INTRAVENOUS at 21:20

## 2018-01-01 RX ADMIN — Medication 10 ML: at 21:07

## 2018-01-01 RX ADMIN — PROPOFOL 20 MCG/KG/MIN: 10 INJECTION, EMULSION INTRAVENOUS at 19:06

## 2018-01-01 RX ADMIN — HYDRALAZINE HYDROCHLORIDE 10 MG: 20 INJECTION INTRAMUSCULAR; INTRAVENOUS at 08:20

## 2018-01-01 RX ADMIN — FUROSEMIDE 100 MG: 10 INJECTION, SOLUTION INTRAMUSCULAR; INTRAVENOUS at 08:51

## 2018-01-01 RX ADMIN — SIMVASTATIN 40 MG: 40 TABLET, FILM COATED ORAL at 21:07

## 2018-01-01 RX ADMIN — SODIUM CHLORIDE 15 MG/HR: 900 INJECTION, SOLUTION INTRAVENOUS at 00:52

## 2018-01-01 RX ADMIN — PROPOFOL 30 MCG/KG/MIN: 10 INJECTION, EMULSION INTRAVENOUS at 14:19

## 2018-01-01 RX ADMIN — Medication 10 ML: at 21:32

## 2018-01-01 RX ADMIN — SODIUM CHLORIDE 9 MG/HR: 900 INJECTION, SOLUTION INTRAVENOUS at 17:13

## 2018-01-01 RX ADMIN — PROPOFOL 15 MCG/KG/MIN: 10 INJECTION, EMULSION INTRAVENOUS at 05:32

## 2018-01-01 RX ADMIN — PROPOFOL 25 MCG/KG/MIN: 10 INJECTION, EMULSION INTRAVENOUS at 01:57

## 2018-01-01 RX ADMIN — SODIUM CHLORIDE 500 ML: 900 INJECTION, SOLUTION INTRAVENOUS at 18:20

## 2018-01-01 RX ADMIN — POTASSIUM & SODIUM PHOSPHATES POWDER PACK 280-160-250 MG 1 PACKET: 280-160-250 PACK at 05:18

## 2018-01-01 RX ADMIN — FUROSEMIDE 20 MG/HR: 10 INJECTION, SOLUTION INTRAMUSCULAR; INTRAVENOUS at 05:22

## 2018-01-01 RX ADMIN — SODIUM CHLORIDE 50 ML/HR: 900 INJECTION, SOLUTION INTRAVENOUS at 20:51

## 2018-01-01 RX ADMIN — SODIUM CHLORIDE 40 MG: 9 INJECTION, SOLUTION INTRAMUSCULAR; INTRAVENOUS; SUBCUTANEOUS at 08:52

## 2018-01-01 RX ADMIN — Medication 10 ML: at 21:36

## 2018-08-19 NOTE — LETTER
[address] [facility] EMERGENCY DEPT One [address] [address] 
[phone] Work/School Note Date: 8/19/2018 To Whom It May concern: 
 
Mauricio Santana was seen and treated today in the emergency room by the following provider(s): 
Attending Provider: Willis Trivedi MD. Mauricio Santana Sincerely, [facility] ED

## 2018-08-20 NOTE — ED NOTES
I have reviewed discharge instructions with the patient. The patient verbalized understanding. Patient left ED via Discharge Method: ambulatory to Home with self. Opportunity for questions and clarification provided. Patient given 0 scripts. To continue your aftercare when you leave the hospital, you may receive an automated call from our care team to check in on how you are doing. This is a free service and part of our promise to provide the best care and service to meet your aftercare needs.  If you have questions, or wish to unsubscribe from this service please call [phone]. Thank you for Choosing our [facility] Emergency Department.

## 2018-08-20 NOTE — ED PROVIDER NOTES
HPI Comments: ppatient has had left hip pain for the past couple weeks. He denies any trauma or obvious precipitating event. The pain has been gradual in onset and getting progressively worse. He states it is worse when he  Bends over at work to pick things up. He denies similar pain in the past, has not taken any medicine for his symptoms. He states he has history of hypertension as well. His doctor adjusted his medication 2 weeks ago. He states \"it always runs high\". Elements of this note were created using speech recognition software. As such, errors of speech recognition may be present. Patient is a 48 y.o. male presenting with hip pain. The history is provided by the patient. Hip Pain           Past Medical History:   Diagnosis Date    Hypertension     Other Ill-Defined Conditions     cholesterol    Stroke [facility])     about 7 yrs ago       No past surgical history on file. No family history on file. Social History     Social History    Marital status:      Spouse name: N/A    Number of children: N/A    Years of education: N/A     Occupational History    Not on file. Social History Main Topics    Smoking status: Current Every Day Smoker    Smokeless tobacco: Not on file      Comment: 5 cigars/day    Alcohol use 2.0 oz/week     4 Cans of beer per week    Drug use: No    Sexual activity: Not on file     Other Topics Concern    Not on file     Social History Narrative    No narrative on file         ALLERGIES: Review of patient's allergies indicates no known allergies. Review of Systems   Constitutional: Negative for chills and fever. Gastrointestinal: Negative for nausea and vomiting. All other systems reviewed and are negative.       Vitals:    08/19/18 2022   BP: (!) 225/127   Pulse: 62   Resp: 20   Temp: 98.9 °F (37.2 °C)   SpO2: 94%   Weight: (!) 172.4 kg (380 lb)   Height: 5' 11\" (1.803 m)            Physical Exam   Constitutional: He is oriented to person, place, and time. He appears well-developed and well-nourished. HENT:   Head: Normocephalic and atraumatic. Eyes: Conjunctivae are normal. Pupils are equal, round, and reactive to light. Neck: Normal range of motion. Neck supple. Musculoskeletal: He exhibits no edema or tenderness. Neurological: He is alert and oriented to person, place, and time. Skin: Skin is warm and dry. Psychiatric: He has a normal mood and affect. His behavior is normal.   Nursing note and vitals reviewed. MDM  Number of Diagnoses or Management Options  Essential hypertension: established and worsening  Pain of left hip joint: new and requires workup  Diagnosis management comments: 11:33 PM discussed results with patient, unremarkable x-rays. He has a primary doctor he can follow up with.  His blood pressure is markedly elevated here, we will repeat this prior to discharge       Amount and/or Complexity of Data Reviewed  Tests in the radiology section of CPT®: ordered and reviewed    Risk of Complications, Morbidity, and/or Mortality  Presenting problems: moderate  Diagnostic procedures: moderate  Management options: moderate    Patient Progress  Patient progress: stable        ED Course       Procedures

## 2018-08-20 NOTE — DISCHARGE INSTRUCTIONS
Hip Pain: Care Instructions  Your Care Instructions    Hip pain may be caused by many things, including overuse, a fall, or a twisting movement. Another cause of hip pain is arthritis. Your pain may increase when you stand up, walk, or squat. The pain may come and go or may be constant. Home treatment can help relieve hip pain, swelling, and stiffness. If your pain is ongoing, you may need more tests and treatment. Follow-up care is a key part of your treatment and safety. Be sure to make and go to all appointments, and call your doctor if you are having problems. It's also a good idea to know your test results and keep a list of the medicines you take. How can you care for yourself at home? · Take pain medicines exactly as directed. ¨ If the doctor gave you a prescription medicine for pain, take it as prescribed. ¨ If you are not taking a prescription pain medicine, ask your doctor if you can take an over-the-counter medicine. · Rest and protect your hip. Take a break from any activity, including standing or walking, that may cause pain. · Put ice or a cold pack against your hip for 10 to 20 minutes at a time. Try to do this every 1 to 2 hours for the next 3 days (when you are awake) or until the swelling goes down. Put a thin cloth between the ice and your skin. · Sleep on your healthy side with a pillow between your knees, or sleep on your back with pillows under your knees. · If there is no swelling, you can put moist heat, a heating pad, or a warm cloth on your hip. Do gentle stretching exercises to help keep your hip flexible. · Learn how to prevent falls. Have your vision and hearing checked regularly. Wear slippers or shoes with a nonskid sole. · Stay at a healthy weight. · Wear comfortable shoes. When should you call for help? Call 911 anytime you think you may need emergency care.  For example, call if:    · You have sudden chest pain and shortness of breath, or you cough up blood.     · You are not able to stand or walk or bear weight.     · Your buttocks, legs, or feet feel numb or tingly.     · Your leg or foot is cool or pale or changes color.     · You have severe pain.    Call your doctor now or seek immediate medical care if:    · You have signs of infection, such as:  ¨ Increased pain, swelling, warmth, or redness in the hip area. ¨ Red streaks leading from the hip area. ¨ Pus draining from the hip area. ¨ A fever.     · You have signs of a blood clot, such as:  ¨ Pain in your calf, back of the knee, thigh, or groin. ¨ Redness and swelling in your leg or groin.     · You are not able to bend, straighten, or move your leg normally.     · You have trouble urinating or having bowel movements.    Watch closely for changes in your health, and be sure to contact your doctor if:    · You do not get better as expected. Where can you learn more? Go to http://myra-gisselle.info/. Enter A464 in the search box to learn more about \"Hip Pain: Care Instructions. \"  Current as of: November 20, 2017  Content Version: 11.7  © 2000-3343 [employer]. Care instructions adapted under license by [employer] (which disclaims liability or warranty for this information). If you have questions about a medical condition or this instruction, always ask your healthcare professional. [place] [address] any warranty or liability for your use of this information.

## 2018-08-20 NOTE — ED TRIAGE NOTES
Pt states he is having left hip pain state that pain has been coming off and on for the last 3 weeks.  Pt able to ambulate with slight limp and he states that the pain was bad tonight that he knew he could not go to work

## 2018-11-02 PROBLEM — J96.02 ACUTE RESPIRATORY FAILURE WITH HYPOXIA AND HYPERCARBIA (HCC): Status: ACTIVE | Noted: 2018-01-01

## 2018-11-02 PROBLEM — I10 HTN (HYPERTENSION): Status: ACTIVE | Noted: 2018-01-01

## 2018-11-02 PROBLEM — E78.5 HLD (HYPERLIPIDEMIA): Status: ACTIVE | Noted: 2018-01-01

## 2018-11-02 PROBLEM — I61.9 HEMORRHAGIC STROKE (HCC): Status: ACTIVE | Noted: 2018-01-01

## 2018-11-02 PROBLEM — E66.01 MORBID OBESITY (HCC): Status: ACTIVE | Noted: 2018-01-01

## 2018-11-02 PROBLEM — J96.01 ACUTE RESPIRATORY FAILURE WITH HYPOXIA AND HYPERCARBIA (HCC): Status: ACTIVE | Noted: 2018-01-01

## 2018-11-02 PROBLEM — R73.9 HYPERGLYCEMIA: Status: ACTIVE | Noted: 2018-01-01

## 2018-11-02 NOTE — PROGRESS NOTES
Ventilator check complete; patient has a #7.5 ET tube secured at the 24 at the teeth. Patient is not sedated. Patient is able to follow commands. Breath sounds are coarse. Trachea is midline, Negative for subcutaneous air, and chest excursion is symmetric. Patient is also Negative for cyanosis and is Negative for pitting edema. All alarms are set and audible. Resuscitation bag is at the head of the bed. Ventilator Settings Mode FIO2 Rate Tidal Volume Pressure PEEP I:E Ratio PRVC  70 %   22 500 ml     8 cm H20 Peak airway pressure: 40 cm H2O Minute ventilation: 11 l/min [address], RT

## 2018-11-02 NOTE — PROGRESS NOTES
Physical Therapy Note: 
 
Participated in interdisciplinary rounds in ICU/CCU and chart reviewed. Patient is experiencing decrease in function from baseline. Patient would benefit from skilled acute therapy to increase independence with self care/ADLs, strength, endurance, and functional mobility. Recommend PT/OT consult when medically stable and MD agrees. Thank you for your consideration, HUMZA [place]T

## 2018-11-02 NOTE — INTERDISCIPLINARY ROUNDS
Interdisciplinary team rounds were held 11/2/2018 with the following team members:Care Management, Nursing, Nurse Practitioner, Nutrition, Pastoral Care, Pharmacy, Physical Therapy, Physician, Respiratory Therapy and Clinical Coordinator. Plan of care discussed. See clinical pathway and/or care plan for interventions and desired outcomes.

## 2018-11-02 NOTE — ED TRIAGE NOTES
Pt at work, felt lightheaded and dizzy. Syncopal episode. Left side flaccid facial drop slurred speech. Reported his head hurt. Last known well 0240.

## 2018-11-02 NOTE — H&P
HOSPITALIST H&P/CONSULTNAME:  Steve Seymour Age:  48 y.o. 
:   1965 MRN:   [id number] PCP: Joce Nur MD 
Consulting MD: Treatment Team: Attending Provider: Pita Jimenez MD; Primary Nurse: Myke Way RN 
HPI:  
Patient is a 54yo AAM with hx htn who presented with acute onset dizziness, syncope, left sided weakness per teleneuro documentation. At time of exam, pt intubated, family not available for additional history. Found to have R basal ganglia bleed with mass effect and severely elevated BP. Per ER physician, neurosurgery determined not a surgical candidate at this time, advise medical management. BP difficult to control on labetalol and cardine until intubated and propofol started. Was apparently very lethargic and minimally responsive prior to intubation. Complete ROS unable to be performed Past Medical History:  
Diagnosis Date  Hemorrhagic stroke ([facility] [address] 2018  Hypertension  Other ill-defined conditions(799.89) cholesterol  Stroke [facility])   
 about 7 yrs ago No past surgical history on file. Prior to Admission Medications Prescriptions Last Dose Informant Patient Reported? Taking? OTHER   Yes No  
POTASSIUM CHLORIDE (KLOR-CON M10 PO)   Yes No  
Sig: take  by mouth daily. carvedilol (COREG) 25 mg tablet   Yes No  
Sig: take 25 mg by mouth two (2) times daily (with meals). hydrochlorothiazide (HYDRODIURIL) 25 mg tablet   Yes No  
Sig: take 25 mg by mouth daily. lisinopril (PRINIVIL, ZESTRIL) 40 mg tablet   Yes No  
Sig: take 40 mg by mouth daily. simvastatin (ZOCOR) 40 mg tablet   Yes No  
Sig: take  by mouth nightly. traMADol (ULTRAM) 50 mg tablet   No No  
Sig: Take 1 Tab by mouth every six (6) hours as needed for Pain for up to 20 doses. Max Daily Amount: 200 mg. Facility-Administered Medications: None No Known Allergies Social History Tobacco Use  Smoking status: Current Every Day Smoker  Tobacco comment: 5 cigars/day Substance Use Topics  Alcohol use: Yes Alcohol/week: 2.0 oz Types: 4 Cans of beer per week No family history on file. Objective:  
 
Visit Vitals BP (!) 165/95 Pulse 61 Temp 97.8 °F (36.6 °C) Resp 21 Wt (!) 175 kg (385 lb 12.9 oz) SpO2 98% BMI 53.81 kg/m² Temp (24hrs), Av.8 °F (36.6 °C), Min:97.8 °F (36.6 °C), Max:97.8 °F (36.6 °C) Oxygen Therapy O2 Sat (%): 98 % (18) Pulse via Oximetry: 64 beats per minute (18) O2 Device: Endotracheal tube(25 at the lip) (18) O2 Flow Rate (L/min): 3 l/min (18) FIO2 (%): 70 % (18) Physical Exam: 
General:    Intubated, morbidly obese Head:   Normocephalic, without obvious abnormality, atraumatic. Nose:  Nares normal. No drainage or sinus tenderness. Lungs:   Intubated, ventilated. Anterior fields clear but globally diminished Heart:   Regular rate and rhythm,  no murmur, rub or gallop. Abdomen:   Soft, non-tender. Not distended. Bowel sounds normal.  
Extremities: No cyanosis. No edema. No clubbing Skin:     Texture, turgor normal. No rashes or lesions. Not Jaundiced Neurologic: obtunded Data Review:  
Recent Results (from the past 24 hour(s)) CBC WITH AUTOMATED DIFF Collection Time: 18  3:46 AM  
Result Value Ref Range WBC 7.3 4.3 - 11.1 K/uL  
 RBC 5.07 4.23 - 5.6 M/uL  
 HGB 15.1 13.6 - 17.2 g/dL HCT 47.4 41.1 - 50.3 % MCV 93.5 79.6 - 97.8 FL  
 MCH 29.8 26.1 - 32.9 PG  
 MCHC 31.9 31.4 - 35.0 g/dL  
 RDW 13.9 % PLATELET 519 470 - 225 K/uL MPV 11.2 9.4 - 12.3 FL ABSOLUTE NRBC 0.00 0.0 - 0.2 K/uL  
 DF AUTOMATED NEUTROPHILS 56 43 - 78 % LYMPHOCYTES 32 13 - 44 % MONOCYTES 8 4.0 - 12.0 % EOSINOPHILS 3 0.5 - 7.8 % BASOPHILS 1 0.0 - 2.0 % IMMATURE GRANULOCYTES 0 0.0 - 5.0 %  
 ABS. NEUTROPHILS 4.0 1.7 - 8.2 K/UL  
 ABS. LYMPHOCYTES 2.3 0.5 - 4.6 K/UL  
 ABS. MONOCYTES 0.6 0.1 - 1.3 K/UL ABS. EOSINOPHILS 0.2 0.0 - 0.8 K/UL  
 ABS. BASOPHILS 0.0 0.0 - 0.2 K/UL  
 ABS. IMM. GRANS. 0.0 0.0 - 0.5 K/UL PROTHROMBIN TIME + INR Collection Time: 11/02/18  3:46 AM  
Result Value Ref Range Prothrombin time 13.4 11.5 - 14.5 sec INR 1.1 METABOLIC PANEL, COMPREHENSIVE Collection Time: 11/02/18  3:46 AM  
Result Value Ref Range Sodium 140 136 - 145 mmol/L Potassium 3.8 3.5 - 5.1 mmol/L Chloride 105 98 - 107 mmol/L  
 CO2 27 21 - 32 mmol/L Anion gap 8 7 - 16 mmol/L Glucose 103 (H) 65 - 100 mg/dL BUN 22 6 - 23 MG/DL Creatinine 2.59 (H) 0.8 - 1.5 MG/DL  
 GFR est AA 34 (L) >60 ml/min/1.73m2 GFR est non-AA 28 (L) >60 ml/min/1.73m2 Calcium 8.5 8.3 - 10.4 MG/DL Bilirubin, total 0.4 0.2 - 1.1 MG/DL  
 ALT (SGPT) 25 12 - 65 U/L  
 AST (SGOT) 13 (L) 15 - 37 U/L Alk. phosphatase 78 50 - 136 U/L Protein, total 7.9 6.3 - 8.2 g/dL Albumin 3.7 3.5 - 5.0 g/dL Globulin 4.2 (H) 2.3 - 3.5 g/dL A-G Ratio 0.9 (L) 1.2 - 3.5 PTT Collection Time: 11/02/18  3:46 AM  
Result Value Ref Range aPTT 29.6 23.2 - 35.3 SEC  
GLUCOSE, POC Collection Time: 11/02/18  3:54 AM  
Result Value Ref Range Glucose (POC) 131 (H) 65 - 100 mg/dL POC G3 Collection Time: 11/02/18  5:18 AM  
Result Value Ref Range Device: VENT    
 FIO2 (POC) 70 % pH (POC) 7.272 (L) 7.35 - 7.45    
 pCO2 (POC) 62.2 (HH) 35 - 45 MMHG  
 pO2 (POC) 99 75 - 100 MMHG  
 HCO3 (POC) 28.7 (H) 22 - 26 MMOL/L  
 sO2 (POC) 96 95 - 98 % Base excess (POC) 0 mmol/L Mode ASSIST CONTROL Set Rate 18 bpm  
 PEEP/CPAP (POC) 8 cmH2O Allens test (POC) YES Inspiratory Time 0.9 sec Site RIGHT RADIAL Patient temp. 98.6 Specimen type (POC) ARTERIAL Performed by Samantha   
 CO2, POC 31 MMOL/L Imaging Tasha Hernandes XR CHEST PORT Final Result IMPRESSION:  
  
1. ET tube tip is in satisfactory position. 2. Bilateral groundglass/airspace opacities, suspicious for pulmonary edema. 3. Enlarged cardiac silhouette, likely cardiomegaly. Superimposed pericardial  
effusion is possible. CT HEAD WO CONT Final Result IMPRESSION:  
  
1. Approximately 4.5 cm acute intraparenchymal hemorrhage in the right frontal  
lobe/basal ganglia. Findings most likely secondary to underlying hypertension. 2. Mass effect and associated 5 mm right-to-left midline shift. Neurosurgical  
consultation is recommended. Rojelio Mcknight Report telephoned to ED physician, Dr. Semaj De Leon, at 3:58 AM. Assessment and Plan: Active Hospital Problems Diagnosis Date Noted  Hemorrhagic stroke ([facility] [address] 11/02/2018  Morbid obesity ([facility] [address] 11/02/2018  
 HTN (hypertension) 11/02/2018  HLD (hyperlipidemia) 11/02/2018  Hyperglycemia 11/02/2018 PLAN: 
Hemorrhagic stroke - neurosurgery recommends med mgmt. Intubated, ICU. On propofol, propanolol, nicardipine for BP control. Vent: per pulm Elevated glucose: not known to have DM, check A1c/FSBS Morbid obesity: noted HLD: home statin DVT PPx: SCD Code Status: full Anticipated discharge: 5-7d Signed By: Sally Hinton MD   
 November 2, 2018

## 2018-11-02 NOTE — PROGRESS NOTES
TRANSFER - IN REPORT: 
 
Verbal report received from CASTILLO Smith(name) on David Cunha  being received from ED(unit) for routine progression of care Report consisted of patients Situation, Background, Assessment and  
Recommendations(SBAR). Information from the following report(s) SBAR, Kardex, ED Summary, Intake/Output, MAR, Recent Results and Cardiac Rhythm NSR was reviewed with the receiving nurse. Opportunity for questions and clarification was provided. Assessment completed upon patients arrival to unit and care assumed. Per ED, Dr. Kanu Fuentes from neurosurgery consulted prior to admission to ICU.

## 2018-11-02 NOTE — PROGRESS NOTES
Pt seen in ICU s/p Hemorrhagic Stroke. Currently intubated/vent. Family in waiting room, sister's, Michelle Pitts [phone] with other siblings and family. Pt does have son, Radha Rodriguez in [place], that would be legal decision maker if no paperwork. States pt was independent and working prior to admission. Lives with his mother. Explained that once extubated, pt will see PT/OT per MD and eval d/c needs. CM to follow and assist with any d/c needs, DME, HH vs STR. Care Management Interventions PCP Verified by CM: Yes(confirms Dr. Claudio Locke) Mode of Transport at Discharge: Other (see comment) Transition of Care Consult (CM Consult): Discharge Planning Discharge Durable Medical Equipment: (none currently) Current Support Network: Relative's Home(Pt lives with mother, he has large family, 2 children, daughter, 17 y/o, son, Radha Rodriguez in [place]. Brothers and sisters currently assisting with decisions and here at hospital. ) Confirm Follow Up Transport: Self(drove self prior to hospitalization) Plan discussed with Pt/Family/Caregiver: Yes Freedom of Choice Offered: Yes [employer] Information Provided?: (confirms BC through pt's work - able to get rx) Discharge Location Discharge Placement: Unable to determine at this time

## 2018-11-02 NOTE — ED PROVIDER NOTES
53-year-old male brought in by EMS after having a syncopal episode at work followed by left-sided paralysis and slurred speech. Patient also complains of a headache and has an elevated blood pressure. Extremity Weakness This is a new problem. The current episode started less than 1 hour ago. The problem occurs constantly. The problem has not changed since onset. The pain is present in the left arm, left elbow, left wrist, left hand, left fingers, left upper leg and left lower leg. The pain is at a severity of 10/10. The pain is severe. Associated symptoms include numbness. He has tried nothing for the symptoms. Past Medical History:  
Diagnosis Date  Hypertension  Other Ill-Defined Conditions   
 cholesterol  Stroke [facility])   
 about 7 yrs ago No past surgical history on file. No family history on file. Social History Socioeconomic History  Marital status:  Spouse name: Not on file  Number of children: Not on file  Years of education: Not on file  Highest education level: Not on file Social Needs  Financial resource strain: Not on file  Food insecurity - worry: Not on file  Food insecurity - inability: Not on file  Transportation needs - medical: Not on file  Transportation needs - non-medical: Not on file Occupational History  Not on file Tobacco Use  Smoking status: Current Every Day Smoker  Tobacco comment: 5 cigars/day Substance and Sexual Activity  Alcohol use: Yes Alcohol/week: 2.0 oz Types: 4 Cans of beer per week  Drug use: No  
 Sexual activity: Not on file Other Topics Concern  Not on file Social History Narrative  Not on file ALLERGIES: Patient has no known allergies. Review of Systems Constitutional: Negative for activity change. HENT: Negative. Eyes: Negative. Respiratory: Negative. Cardiovascular: Negative. Gastrointestinal: Negative. Genitourinary: Negative. Musculoskeletal: Negative. Skin: Negative. Neurological: Positive for weakness and numbness. Psychiatric/Behavioral: Negative. All other systems reviewed and are negative. There were no vitals filed for this visit. Physical Exam  
Constitutional: He is oriented to person, place, and time. He appears well-developed and well-nourished. He appears distressed. HENT:  
Head: Normocephalic and atraumatic. Right Ear: External ear normal.  
Left Ear: External ear normal.  
Nose: Nose normal.  
Eyes: Conjunctivae and EOM are normal. Pupils are equal, round, and reactive to light. Right eye exhibits no discharge. Left eye exhibits no discharge. No scleral icterus. Neck: Normal range of motion. Cardiovascular: Regular rhythm. Pulmonary/Chest: Effort normal and breath sounds normal. No stridor. No respiratory distress. He has no wheezes. He has no rales. Abdominal: Soft. Bowel sounds are normal. He exhibits no distension. There is no tenderness. Musculoskeletal: Normal range of motion. Neurological: He is alert and oriented to person, place, and time. A cranial nerve deficit and sensory deficit is present. He exhibits abnormal muscle tone. Coordination normal. GCS eye subscore is 4. GCS verbal subscore is 5. GCS motor subscore is 6. Skin: Skin is warm. No rash noted. He is diaphoretic. Psychiatric: He has a normal mood and affect. His behavior is normal.  
  
 
MDM Number of Diagnoses or Management Options Hemorrhagic cerebrovascular accident (CVA) [facility]): new and requires workup Hypertensive emergency: new and requires workup Diagnosis management comments: She arrived barely able to speak very slurred speech vomited one while in CT. He continued to deteriorate became somnolent as showing respirations and patient was intubated to protect airway.   Blood pressure was progressively controlled and reduced to 137/76. Discussed with intensivist to manage  have hospice  the CVA. Amount and/or Complexity of Data Reviewed Clinical lab tests: ordered and reviewed Tests in the radiology section of CPT®: ordered and reviewed Tests in the medicine section of CPT®: ordered and reviewed Risk of Complications, Morbidity, and/or Mortality Presenting problems: high Diagnostic procedures: high Management options: high General comments: =================================================================== This patient is critically ill and there is a high probability of of imminent or life threatening deterioration in the patient's condition without immediate management. The nature of the patient's clinical problem is:hemorrhagic CVA I have spent 45 minutesin direct patient care, documentation, review of labs/xrays/old records, discussion with intensivist and hospitalist . The time involved in the performance of separately reportable procedures was not counted toward critical care time. Charis Dancer, MD; 11/2/2018 @9:05 AM 
=================================================================== 
 
Critical Care Total time providing critical care: 30-74 minutes Patient Progress Patient progress: other (comment) Intubation Date/Time: 11/2/2018 8:54 AM 
Performed by: Vance Soto MD 
Authorized by: Vance Soto MD  
 
Consent:  
  Consent obtained:  Verbal 
  Consent given by:  Patient Risks discussed:  Aspiration, brain injury, bleeding, death, dental trauma, hypoxia, laryngeal injury and pneumothorax Alternatives discussed:  No treatment and delayed treatment Pre-procedure details:  
  Patient status:  Altered mental status Mallampati score:  IV 
  Pretreatment meds: etomidate. Paralytics:  Succinylcholine Procedure details:  
  Preoxygenation:  Bag valve mask CPR in progress: no Intubation method:  Oral 
  Oral intubation technique:  Video-assisted Tube size (mm):  7.5 Tube type:  Cuffed Number of attempts:  2 Ventilation between attempts: yes Cricoid pressure: no   
  Tube visualized through cords: yes Placement assessment:  
  Tube secured with:  ETT boyle Breath sounds:  Equal 
  Placement verification: chest rise, CXR verification, direct visualization and equal breath sounds CXR findings:  ETT in proper place Post-procedure details:  
  Patient tolerance of procedure: Tolerated well, no immediate complications

## 2018-11-02 NOTE — H&P
HISTORY AND PHYSICAL Linette Ferrer 11/2/2018 Date of Admission:  11/2/2018 The patient's chart is reviewed and the patient is discussed with the staff. Admission was requested by Dr. Audrey Bonilla of the [address] [place] [address] ED 
 
HPI:  
 
Linette Ferrer is a 48 y.o. male with a PMH of hypertension (reportedly off medications) who presents with syncope, headache, slurred speech and left-sided hemiparesis which developed while at work early this morning. He was found to have a large 4.5cmm R frontal intraparenchymal hemorrhage with 5mm shift from L to R. Neurosurgery reviewed with Dr. Audrey Bonilla and recommended nonsurgical medical management. In the ER he has had hypertension with SBP of 208/102 initially and was started on nicardipine at 10mg. The patient was intubated due to vomiting with heavy oral secretions and  poor airway protection. He was initially awake, though lethargic. Teleneurology evaluated and recommended SBP goal of 140/90. He is currently on propofol for sedation as he was agitated on the ventilator. REVIEW OF SYSTEMS: 
Unable to obtain Prior to Admission Medications Prescriptions Last Dose Informant Patient Reported? Taking? OTHER   Yes No  
POTASSIUM CHLORIDE (KLOR-CON M10 PO)   Yes No  
Sig: take  by mouth daily. carvedilol (COREG) 25 mg tablet   Yes No  
Sig: take 25 mg by mouth two (2) times daily (with meals). hydrochlorothiazide (HYDRODIURIL) 25 mg tablet   Yes No  
Sig: take 25 mg by mouth daily. lisinopril (PRINIVIL, ZESTRIL) 40 mg tablet   Yes No  
Sig: take 40 mg by mouth daily. simvastatin (ZOCOR) 40 mg tablet   Yes No  
Sig: take  by mouth nightly. traMADol (ULTRAM) 50 mg tablet   No No  
Sig: Take 1 Tab by mouth every six (6) hours as needed for Pain for up to 20 doses. Max Daily Amount: 200 mg. Facility-Administered Medications: None Past Medical History:  
Diagnosis Date  Hemorrhagic stroke ([facility] [address] 11/2/2018  Hypertension  Other ill-defined conditions(799.89) cholesterol  Stroke [facility])   
 about 7 yrs ago No past surgical history on file. Social History Socioeconomic History  Marital status:  Spouse name: Not on file  Number of children: Not on file  Years of education: Not on file  Highest education level: Not on file Social Needs  Financial resource strain: Not on file  Food insecurity - worry: Not on file  Food insecurity - inability: Not on file  Transportation needs - medical: Not on file  Transportation needs - non-medical: Not on file Occupational History  Not on file Tobacco Use  Smoking status: Current Every Day Smoker  Tobacco comment: 5 cigars/day Substance and Sexual Activity  Alcohol use: Yes Alcohol/week: 2.0 oz Types: 4 Cans of beer per week  Drug use: No  
 Sexual activity: Not on file Other Topics Concern  Not on file Social History Narrative  Not on file No family history on file. No Known Allergies Current Facility-Administered Medications Medication Dose Route Frequency  niCARdipine in Saline (CARDENE) 25 MG/250 mL infusion kit  0-15 mg/hr IntraVENous TITRATE  propofol (DIPRIVAN) infusion  0-50 mcg/kg/min IntraVENous TITRATE  simvastatin (ZOCOR) tablet 40 mg  40 mg Oral QHS  sodium chloride (NS) flush 5-10 mL  5-10 mL IntraVENous Q8H  
 sodium chloride (NS) flush 5-10 mL  5-10 mL IntraVENous PRN  
 tuberculin injection 5 Units  5 Units IntraDERMal ONCE  
 glycopyrrolate (ROBINUL) injection 0.1 mg  0.1 mg IntraVENous Q4H PRN  
 insulin regular (NOVOLIN R, HUMULIN R) injection   SubCUTAneous Q6H Objective: PHYSICAL EXAM  
 
Vitals:  
 11/02/18 8240 11/02/18 8264 11/02/18 9963 11/02/18 0725 BP: (!) 165/95 (!) 194/111 Pulse: 61 65  67 Resp:    22 Temp:      
SpO2:    93% Weight:   (!) 381 lb 9.9 oz (173.1 kg) Constitutional:  the patient is well developed and in no acute distress EENMT:  Sclera clear, pupils equal, oral mucosa moist 
Respiratory: Clear vented sounds Cardiovascular:  RRR without M,G,R 
Gastrointestinal: soft and non-tender; with positive bowel sounds. Musculoskeletal: warm without cyanosis. There is trace lower leg edema. Skin:  no jaundice or rashes, no wounds Neurologic: currently sedated on propofol so complete exam not possible. CT head 11/2: 
1. Approximately 4.5 cm acute intraparenchymal hemorrhage in the right frontal 
lobe/basal ganglia. Findings most likely secondary to underlying hypertension. 2. Mass effect and associated 5 mm right-to-left midline shift. Neurosurgical 
consultation is recommended. CXR: 
 
 
Recent Labs 11/02/18 
0346 WBC 7.3 HGB 15.1 HCT 47.4  INR 1.1 Recent Labs 11/02/18 
0346   
K 3.8  * CO2 27 BUN 22  
CREA 2.59* CA 8.5 ALB 3.7 TBILI 0.4 ALT 25 SGOT 13* No results for input(s): PH, PCO2, PO2, HCO3 in the last 72 hours. No results for input(s): LCAD, LAC in the last 72 hours. Assessment:  (Medical Decision Making) Hospital Problems  Never Reviewed Codes Class Noted POA * (Principal) Hemorrhagic stroke ([facility] [address] ICD-10-CM: I61.9 ICD-9-CM: 260  11/2/2018 Yes  
 nonsurgical per Dr. Gretta Miguel with plans for BP optimization. If not alert, start tube feeds today. Morbid obesity ([facility] [address] ICD-10-CM: E66.01 
ICD-9-CM: 278.01  11/2/2018 Yes Suspect SWAPNIL, currently intubated but no report of a difficult airway in ER  
 HTN (hypertension) ICD-10-CM: I10 
ICD-9-CM: 401.9  11/2/2018 Yes Goal 140/90 HLD (hyperlipidemia) ICD-10-CM: E78.5 ICD-9-CM: 272.4  11/2/2018 Yes Hyperglycemia ICD-10-CM: R73.9 ICD-9-CM: 790.29  11/2/2018 Unknown Will need sliding scale Acute respiratory failure with hypercarbia:   With vomiting and initial concerns about airway protection given drooling and vomiting. Will wean propofol and reassess today. Prn glycopyrrolate for oral secretions. Plan:  (Medical Decision Making) --Plan to optimize BP with nicardipine and goal BP of 140/90 
--sedation vacation this morning with reassessment of mental status, neuro exam once nausea better controlled. Prn zofran ordered 
--add prn morphine for pain. --Neurology and Hospitalist involvement appreciated. [facility] can manage primarily with neurology/NSG until extubated. --Will need f/u imaging in future per neurology. --goal FSBS 140-180 
--SCDs More than 50% of the time documented was spent in face-to-face contact with the patient and in the care of the patient on the floor/unit where the patient is located.  
 
John Sparks MD

## 2018-11-02 NOTE — PROGRESS NOTES
Patient admitted to ICU. Patient is intubated and sedated. Pupils 2mm and minimally reactive. Follows commands with RUE and BLE, LUE flaccid. Strong cough with suction. Abdomen is obese and intact, with active bowel sounds. OGT connected to suction, brown gastric output. Pulses present and palpable to all extremities. BLE dry and skin flaky, no other skin issues noted. VSS.

## 2018-11-02 NOTE — H&P
HISTORY AND PHYSICAL Edu Arellano 11/2/2018 Date of Admission:  11/2/2018 The patient's chart is reviewed and the patient is discussed with the staff. Admission was requested by Dr. Ian Crook of the [id number] [facility] ED 
 
HPI:  
 
Edu Arellano is a 48 y.o. male with a PMH of hypertension (reportedly off medications) who presents with syncope, headache, slurred speech and left-sided hemiparesis which developed while at work early this morning. He was found to have a large 4.5cmm R frontal intraparenchymal hemorrhage with 5mm shift from L to R. Neurosurgery reviewed with Dr. Ian Corok and recommended nonsurgical medical management. In the ER he has had hypertension with SBP of 208/102 initially and was started on nicardipine at 10mg. The patient was intubated due to vomiting with heavy oral secretions and  poor airway protection. He was initially awake, though lethargic. Teleneurology evaluated and recommended SBP goal of 140/90. He is currently on propofol for sedation as he was agitated on the ventilator. REVIEW OF SYSTEMS: 
Unable to obtain Prior to Admission Medications Prescriptions Last Dose Informant Patient Reported? Taking? OTHER   Yes No  
POTASSIUM CHLORIDE (KLOR-CON M10 PO)   Yes No  
Sig: take  by mouth daily. carvedilol (COREG) 25 mg tablet   Yes No  
Sig: take 25 mg by mouth two (2) times daily (with meals). hydrochlorothiazide (HYDRODIURIL) 25 mg tablet   Yes No  
Sig: take 25 mg by mouth daily. lisinopril (PRINIVIL, ZESTRIL) 40 mg tablet   Yes No  
Sig: take 40 mg by mouth daily. simvastatin (ZOCOR) 40 mg tablet   Yes No  
Sig: take  by mouth nightly. traMADol (ULTRAM) 50 mg tablet   No No  
Sig: Take 1 Tab by mouth every six (6) hours as needed for Pain for up to 20 doses. Max Daily Amount: 200 mg. Facility-Administered Medications: None Past Medical History:  
Diagnosis Date  Hemorrhagic stroke ([facility] [address] 11/2/2018  Hypertension  Other ill-defined conditions(799.89) cholesterol  Stroke [facility])   
 about 7 yrs ago No past surgical history on file. Social History Socioeconomic History  Marital status:  Spouse name: Not on file  Number of children: Not on file  Years of education: Not on file  Highest education level: Not on file Social Needs  Financial resource strain: Not on file  Food insecurity - worry: Not on file  Food insecurity - inability: Not on file  Transportation needs - medical: Not on file  Transportation needs - non-medical: Not on file Occupational History  Not on file Tobacco Use  Smoking status: Current Every Day Smoker  Tobacco comment: 5 cigars/day Substance and Sexual Activity  Alcohol use: Yes Alcohol/week: 2.0 oz Types: 4 Cans of beer per week  Drug use: No  
 Sexual activity: Not on file Other Topics Concern  Not on file Social History Narrative  Not on file No family history on file. No Known Allergies Current Facility-Administered Medications Medication Dose Route Frequency  labetalol (NORMODYNE;TRANDATE) 300 mg in 0.9% sodium chloride 150 mL (2 mg / 1 mL) infusion  0.5-2 mg/min IntraVENous TITRATE  niCARdipine in Saline (CARDENE) 25 MG/250 mL infusion kit  0-15 mg/hr IntraVENous TITRATE  propofol (DIPRIVAN) infusion  0-50 mcg/kg/min IntraVENous TITRATE Current Outpatient Medications Medication Sig  
 traMADol (ULTRAM) 50 mg tablet Take 1 Tab by mouth every six (6) hours as needed for Pain for up to 20 doses. Max Daily Amount: 200 mg.  
 lisinopril (PRINIVIL, ZESTRIL) 40 mg tablet take 40 mg by mouth daily.  POTASSIUM CHLORIDE (KLOR-CON M10 PO) take  by mouth daily.  simvastatin (ZOCOR) 40 mg tablet take  by mouth nightly.  carvedilol (COREG) 25 mg tablet take 25 mg by mouth two (2) times daily (with meals).  hydrochlorothiazide (HYDRODIURIL) 25 mg tablet take 25 mg by mouth daily.  OTHER Objective: PHYSICAL EXAM  
 
Vitals:  
 11/02/18 0261 11/02/18 0612 11/02/18 0547 11/02/18 6452 BP: 153/89 (!) 165/95 (!) 165/95 (!) 194/111 Pulse: 64 63 61 65 Resp: 22 21 Temp:      
SpO2: 98% 98% Weight:      
 
Constitutional:  the patient is well developed and in no acute distress EENMT:  Sclera clear, pupils equal, oral mucosa moist 
Respiratory: Clear vented sounds Cardiovascular:  RRR without M,G,R 
Gastrointestinal: soft and non-tender; with positive bowel sounds. Musculoskeletal: warm without cyanosis. There is trace lower leg edema. Skin:  no jaundice or rashes, no wounds Neurologic: currently sedated on propofol so complete exam not possible. CT head 11/2: 
1. Approximately 4.5 cm acute intraparenchymal hemorrhage in the right frontal 
lobe/basal ganglia. Findings most likely secondary to underlying hypertension. 2. Mass effect and associated 5 mm right-to-left midline shift. Neurosurgical 
consultation is recommended. CXR: 
 
 
Recent Labs 11/02/18 
0346 WBC 7.3 HGB 15.1 HCT 47.4  INR 1.1 Recent Labs 11/02/18 
0346   
K 3.8  * CO2 27 BUN 22  
CREA 2.59* CA 8.5 ALB 3.7 TBILI 0.4 ALT 25 SGOT 13* No results for input(s): PH, PCO2, PO2, HCO3 in the last 72 hours. No results for input(s): LCAD, LAC in the last 72 hours. Assessment:  (Medical Decision Making) Hospital Problems  Never Reviewed Codes Class Noted POA * (Principal) Hemorrhagic stroke ([facility] [address] ICD-10-CM: I61.9 ICD-9-CM: 480  11/2/2018 Yes  
 nonsurgical per Dr. Ping Lentz with plans for BP optimization. If not alert, start tube feeds today. Morbid obesity ([facility] [address] ICD-10-CM: E66.01 
ICD-9-CM: 278.01  11/2/2018 Yes  Suspect SWAPNIL, currently intubated but no report of a difficult airway in ER  
 HTN (hypertension) ICD-10-CM: M69 
 ICD-9-CM: 401.9  11/2/2018 Yes Goal 140/90 HLD (hyperlipidemia) ICD-10-CM: E78.5 ICD-9-CM: 272.4  11/2/2018 Yes Hyperglycemia ICD-10-CM: R73.9 ICD-9-CM: 790.29  11/2/2018 Unknown Will need sliding scale Acute respiratory failure with hypercarbia: With vomiting and initial concerns about airway protection given drooling and vomiting. Will wean propofol and reassess today. Prn glycopyrrolate for oral secretions. Plan:  (Medical Decision Making) --Plan to optimize BP with nicardipine and goal BP of 140/90 
--sedation vacation this morning with reassessment of mental status, neuro exam once nausea better controlled. Prn zofran ordered 
--add prn morphine for pain. --Neurology and Hospitalist involvement appreciated. [facility] can manage with neurology until extubated. --Will need f/u imaging in future per neurology. --goal FSBS 140-180 
--SCDs More than 50% of the time documented was spent in face-to-face contact with the patient and in the care of the patient on the floor/unit where the patient is located.  
 
Dallin Blackwell MD

## 2018-11-02 NOTE — PROGRESS NOTES
Patient was intubated with a number 7.5 ET Tube. Tube placement verified by auscultation and ETCO2 monitor. ET Tube is secured at the 25 cm blade at the lip and on the left side. Patient was intubated by  on the 1 attempt. Breath sounds are coarse. Patient is Negative for subcutaneous air and chest excursion is symmetric. Trachea is midline. Patient is also Negative for cyanosis. Patient placed on ventilator on documented settings. All alarms are set and audible. Resuscitation bag is at the head of the bed. Ventilator Settings Mode FIO2 Rate Tidal Volume Pressure PEEP I:E Ratio PRVC  100%   15   500      8 Peak airway pressure:    
Minute ventilation: ABG: No results for input(s): PH, PCO2, PO2, HCO3 in the last 72 hours.

## 2018-11-02 NOTE — ROUTINE PROCESS
TRANSFER - OUT REPORT: 
 
Verbal report given to ICU RN(name) on Trish Steel  being transferred to ICU(unit) for routine progression of care Report consisted of patients Situation, Background, Assessment and  
Recommendations(SBAR). Information from the following report(s) SBAR, Kardex, ED Summary, Procedure Summary, MAR and Recent Results was reviewed with the receiving nurse. Lines:    
 
Opportunity for questions and clarification was provided. Patient transported with: 
 Monitor Registered Nurse  
respiratory,

## 2018-11-02 NOTE — PROGRESS NOTES
Impression: 
 
H hypertensive type Recommend: 
 
BP control with SBP less than 140. Delayed MRI MRA Speech PT OT when appropriate Rehab consult No anticoagulation---SCD's 
 
CC: stroke Patient is a 82-year-old male with a history of hypertension and type 2 diabetes who presented at midnight on November 2 after an episode of dizziness followed by loss of consciousness with left-sided weakness. Patient lost control of his airway and required intubation. He seen by teleneurology. CT of the head revealed a large right subcortical hemorrhage. Blood pressure was initially elevated at time and was treated successfully with IV Cardene. Patient was seen by neurosurgery and not felt to require surgical intervention Past Medical History:  
Diagnosis Date  Hemorrhagic stroke ([facility] 11/2/2018  Hypertension  Other ill-defined conditions(799.89) cholesterol  Stroke [facility])   
 about 7 yrs ago No past surgical history on file. Social History Tobacco Use  Smoking status: Current Every Day Smoker  Tobacco comment: 5 cigars/day Substance Use Topics  Alcohol use: Yes Alcohol/week: 2.0 oz Types: 4 Cans of beer per week  Drug use: No  
 
No family history on file. Current Facility-Administered Medications:  
  niCARdipine in Saline (CARDENE) 25 MG/250 mL infusion kit, 0-15 mg/hr, IntraVENous, TITRATE, Shantell Valdes MD, Last Rate: 75 mL/hr at 11/02/18 1508, 7.5 mg/hr at 11/02/18 1508   propofol (DIPRIVAN) infusion, 0-50 mcg/kg/min, IntraVENous, TITRATE, Shantell Valdes MD, Last Rate: 21 mL/hr at 11/02/18 1437, 20 mcg/kg/min at 11/02/18 1437   simvastatin (ZOCOR) tablet 40 mg, 40 mg, Oral, QHS, Brittany Gandhi MD 
  sodium chloride (NS) flush 5-10 mL, 5-10 mL, IntraVENous, Q8H, Brittany Gandhi MD, 10 mL at 11/02/18 1450   sodium chloride (NS) flush 5-10 mL, 5-10 mL, IntraVENous, PRN, Brittany Gandhi MD 
   glycopyrrolate (ROBINUL) injection 0.1 mg, 0.1 mg, IntraVENous, Q4H PRN, Moises Hodges MD 
  insulin regular (NOVOLIN R, HUMULIN R) injection, , SubCUTAneous, Q6H, Moises Hodges MD, Stopped at 11/02/18 0033   tuberculin injection 5 Units, 5 Units, IntraDERMal, ONCE, José Melchor MD, 5 Units at 11/02/18 0836 
  pantoprazole (PROTONIX) 40 mg in sodium chloride 0.9% 10 mL injection, 40 mg, IntraVENous, DAILY, José Melchor MD, 40 mg at 11/02/18 0941 
  NUTRITIONAL SUPPORT ELECTROLYTE PRN ORDERS, , Does Not Apply, PRN, Moises Hodges MD 
 
No Known Allergies Patient is intubated and cannot participate and review of systems Visit Vitals /61 Pulse 65 Temp 97.8 °F (36.6 °C) Resp 22 Wt (!) 381 lb 9.9 oz (173.1 kg) SpO2 93% BMI 53.22 kg/m² Patient examined on IV propofol and intubated Pupils 3-4 mm reactive bilaterally Oculocephalics are intact No motor response to pain in the left upper extremity weak withdrawal on the right Recent Results (from the past 12 hour(s)) HEMOGLOBIN A1C WITH EAG Collection Time: 11/02/18  3:50 AM  
Result Value Ref Range Hemoglobin A1c 6.0 4.8 - 6.0 % Est. average glucose 126 mg/dL GLUCOSE, POC Collection Time: 11/02/18  3:54 AM  
Result Value Ref Range Glucose (POC) 131 (H) 65 - 100 mg/dL POC G3 Collection Time: 11/02/18  5:18 AM  
Result Value Ref Range Device: VENT    
 FIO2 (POC) 70 % pH (POC) 7.272 (L) 7.35 - 7.45    
 pCO2 (POC) 62.2 (HH) 35 - 45 MMHG  
 pO2 (POC) 99 75 - 100 MMHG  
 HCO3 (POC) 28.7 (H) 22 - 26 MMOL/L  
 sO2 (POC) 96 95 - 98 % Base excess (POC) 0 mmol/L Mode ASSIST CONTROL Set Rate 18 bpm  
 PEEP/CPAP (POC) 8 cmH2O Allens test (POC) YES Inspiratory Time 0.9 sec Site RIGHT RADIAL Patient temp. 98.6 Specimen type (POC) ARTERIAL Performed by Samantha   
 CO2, POC 31 MMOL/L  
DRUG SCREEN, URINE  Collection Time: 11/02/18  8:13 AM  
 Result Value Ref Range PCP(PHENCYCLIDINE) NEGATIVE BENZODIAZEPINES NEGATIVE     
 COCAINE NEGATIVE AMPHETAMINES NEGATIVE METHADONE NEGATIVE     
 THC (TH-CANNABINOL) NEGATIVE     
 OPIATES POSITIVE    
 BARBITURATES NEGATIVE     
GLUCOSE, POC Collection Time: 11/02/18  8:42 AM  
Result Value Ref Range Glucose (POC) 129 (H) 65 - 100 mg/dL GLUCOSE, POC Collection Time: 11/02/18 12:25 PM  
Result Value Ref Range Glucose (POC) 108 (H) 65 - 100 mg/dL Signed By: Pernell Owen MD   
 November 2, 2018

## 2018-11-02 NOTE — PROGRESS NOTES
11/02/18 4176 Dual Skin Pressure Injury Assessment Dual Skin Pressure Injury Assessment WDL Second Care Provider (Based on [address] [place] [address]) Gabby Bautista, CASTILLO Dual skin assessment completed. No evidence of pressure injury. Allevyn placed for skin protection. Bilateral feet/heels/ankles dry and flaky.

## 2018-11-02 NOTE — PROGRESS NOTES
Initial visit made to patient and a prayer was provided. The patient was asleep. A  card was left. Peter Lyn

## 2018-11-02 NOTE — ED NOTES
Labetalol infusion discontinued, per Alo Greer in pharmacy, tube back to pharmacy . Sent at this time.

## 2018-11-02 NOTE — PROGRESS NOTES
Problem: Nutrition Deficit Goal: *Optimize nutritional status Nutrition: 
Nutrition Consult for TF Management and Electrolyte Replacement Management. (Dr. Farhana Robles) Assessment: Anthropometrics: Ht - 5'11\", wgt - 173.1 kg (ICU bed 11/2/18), BMI 53 c/w obesity class III, edema - none. Macronutrient Needs: 
Estimated calorie needs - 0285-9861 ayanna/day (11-14 ayanna/kg/day) Estimated protein needs - 62-94 gm pro/day (0.8-1.2 gm pro/kgIBW/day) (GFR 34 ml/min) Max CHO/day - 303 gm CHO/day (50% ayanna/day) Fluid/day - 1.9-2.4 liters/day (1 ml/ayanna/day) Intake/Comparative Standards: The patient is currently NPO and receiving Diprivan infusing @ 31.5 ml/hr which contributes 832 calories/day, meeting 44% of his calorie and 0% of his protein needs. Pertinent Labs/Hemodynamic Parameters: BUN 22, creatinine 2.59, hemoglobin A1c 6.0; MAP - 101. Pertinent Medications/Drips: SSI coverage; Cardene and Diprivan drips. GI Function/Activity: 
 Large, obese abdomen with active bowel sounds. Diet: 
 NPO. Enteral Access: 
 OGT. Food/Nutrition History: 
 48year old morbidly obese gentleman with a h/o hypertension and hyperlipidemia admitted with hemorrhagic stroke. He aspirated in the ED requiring intubation and ongoing ventilator support. Diagnosis (Nutrition): Inadequate energy intake related to NPO status as evidenced by the patient being intubated and ventilated and requiresTF for nutrition support. Intervention: 
Meals and Snacks: NPO. Enteral Nutrition: Start TF with Promote @ 20 ml/hr with a 20 ml/hr water flush via OGT. Increase TF as tolerated to the goal rate of 50 ml/hr - 1200 calories/day (63% calorie goal), 76 grams protein/day (100% protein goal), 156 grams CHO/day (does not exceed max CHO limit) and 1488 ml water/day (100% fluid goal). Diprivan contributes an additional 832 calories/day. Mineral Supplement Therapy: Nutrition Support Orders for Electrolyte Management Replacements are activated and placed on the [facility] Coordination of Nutrition Care by a Nutrition Professional: AM ICU rounds, collaboration with Adenike Roque RN. Nutrition Discharge Plan: Too soon to determine. Randell KnightLocassa Hocvíctor. Isis Gilbert 
[phone]

## 2018-11-03 PROBLEM — N17.9 AKI (ACUTE KIDNEY INJURY) (HCC): Status: ACTIVE | Noted: 2018-01-01

## 2018-11-03 NOTE — CONSULTS
[facility] Consult Subjective:  
 
Court Batista is a 48 y.o.  male who is being seen for ZENAIDA and HTN. This is an unfortunate gentleman with a history of CKD stage 3, HTN on polypharmacy and chronic active tobacco use admitted with a hemorrhagic CVA after becoming dizzy and hemiparetic at work. He has been evaluated by Neurology and Neurosurgery with a goal SBP of 140 determined. He is on NTG and Cardene with blood pressures in the 120-140 range. Intubated for airway control though he remains on a fairly high FiO2. CXR with pulmonary edema. He is oliguric and creatinine has risen to the low 3's from a suspected baseline in the mid 2's. Based on [address] records, he may take NSAIDs chronical;ly in addition to an ARB. He has gout. He has a remote history of CVA. Past Medical History:  
Diagnosis Date  Hemorrhagic stroke ([facility] [address] 11/2/2018  Hypertension  Other ill-defined conditions(799.89) cholesterol  Stroke [facility])   
 about 7 yrs ago No past surgical history on file. No family history on file. Social History Tobacco Use  Smoking status: Current Every Day Smoker  Tobacco comment: 5 cigars/day Substance Use Topics  Alcohol use: Yes Alcohol/week: 2.0 oz Types: 4 Cans of beer per week Current Facility-Administered Medications Medication Dose Route Frequency Provider Last Rate Last Dose  
 0.9% sodium chloride infusion  50 mL/hr IntraVENous CONTINUOUS Christy Whittaker MD 50 mL/hr at 11/03/18 0037 50 mL/hr at 11/03/18 0037  simvastatin (ZOCOR) tablet 40 mg  40 mg Per NG tube QHS Christy Whittaker MD      
 nitroglycerin (Tridil) 200 mcg/ml infusion  0-20 mcg/min IntraVENous TITRATE Arleen Jasso MD 1.5 mL/hr at 11/03/18 0744 5 mcg/min at 11/03/18 0744  potassium, sodium phosphates (NEUTRA-PHOS) packet 1 Packet  1 Packet Per Christy Linares MD      
  furosemide (LASIX) injection 100 mg  100 mg IntraVENous Q12H Fletcher Kamara MD      
 niCARdipine in Saline (CARDENE) 25 MG/250 mL infusion kit  0-15 mg/hr IntraVENous TITRATE Shannan Cheema MD   Stopped at 11/03/18 0029  
 propofol (DIPRIVAN) infusion  0-50 mcg/kg/min IntraVENous TITRATE Shannan Cheema MD 15.8 mL/hr at 11/03/18 0752 15 mcg/kg/min at 11/03/18 8464  sodium chloride (NS) flush 5-10 mL  5-10 mL IntraVENous Q8H Peter Duarte MD   10 mL at 11/03/18 2425  sodium chloride (NS) flush 5-10 mL  5-10 mL IntraVENous PRN Peter Duarte MD      
 glycopyrrolate [facility]) injection 0.1 mg  0.1 mg IntraVENous Q4H PRN Ethel Monroy MD      
 insulin regular (Deri Layton R, HUMULIN R) injection   SubCUTAneous Matthew Samayoa MD   Stopped at 11/02/18 2971  pantoprazole (PROTONIX) 40 mg in sodium chloride 0.9% 10 mL injection  40 mg IntraVENous DAILY Ulises Yepez MD   40 mg at 11/02/18 0941  
 NUTRITIONAL SUPPORT ELECTROLYTE PRN ORDERS   Does Not Apply PRN Ethel Monroy MD      
 niCARdipine (CARDENE) 100 mg in 0.9% sodium chloride 250 mL infusion  0-15 mg/hr IntraVENous TITRATE Ethel Monroy MD 37.5 mL/hr at 11/03/18 0818 15 mg/hr at 11/03/18 0818 No Known Allergies Review of Systems: 
Pertinent items are noted in the History of Present Illness. Objective: Intake and Output:   
11/03 0701 - 11/03 1900 In: 131 Out: -  
11/01 1901 - 11/03 0700 In: 3644.3 [I.V.:2819.3] Out: Beth Zaman Physical Exam:  
General appearance: sedated on vent Neck: supple Lungs: clear to auscultation bilaterally Heart: regular rate and rhythm, S1, S2 normal, no murmur, click, rub or gallop Abdomen: soft, non-tender. Bowel sounds normal. No masses,  no organomegaly Extremities: 1+ edema Data Review:  
Recent Results (from the past 24 hour(s)) GLUCOSE, POC Collection Time: 11/02/18  8:42 AM  
Result Value Ref Range Glucose (POC) 129 (H) 65 - 100 mg/dL GLUCOSE, POC Collection Time: 11/02/18 12:25 PM  
Result Value Ref Range Glucose (POC) 108 (H) 65 - 100 mg/dL GLUCOSE, POC Collection Time: 11/02/18  5:16 PM  
Result Value Ref Range Glucose (POC) 120 (H) 65 - 100 mg/dL GLUCOSE, POC Collection Time: 11/03/18 12:27 AM  
Result Value Ref Range Glucose (POC) 131 (H) 65 - 100 mg/dL POC G3 Collection Time: 11/03/18  3:20 AM  
Result Value Ref Range Device: VENT    
 FIO2 (POC) 70 % pH (POC) 7.500 (H) 7.35 - 7.45    
 pCO2 (POC) 34.2 (L) 35 - 45 MMHG  
 pO2 (POC) 68 (L) 75 - 100 MMHG  
 HCO3 (POC) 26.7 (H) 22 - 26 MMOL/L  
 sO2 (POC) 95 95 - 98 % Base excess (POC) 4 mmol/L Mode Pressure regulated volume control Tidal volume 500 ml Set Rate 22 bpm  
 PEEP/CPAP (POC) 8 cmH2O Allens test (POC) YES Inspiratory Time 0.9 sec Site LEFT RADIAL Patient temp. 98.6 Specimen type (POC) ARTERIAL Performed by Michael   
 CO2, POC 28 MMOL/L  
PHOSPHORUS Collection Time: 11/03/18  3:38 AM  
Result Value Ref Range Phosphorus 1.6 (L) 2.5 - 4.5 MG/DL MAGNESIUM Collection Time: 11/03/18  3:38 AM  
Result Value Ref Range Magnesium 2.1 1.8 - 2.4 mg/dL METABOLIC PANEL, BASIC Collection Time: 11/03/18  3:38 AM  
Result Value Ref Range Sodium 142 136 - 145 mmol/L Potassium 3.8 3.5 - 5.1 mmol/L Chloride 107 98 - 107 mmol/L  
 CO2 27 21 - 32 mmol/L Anion gap 8 7 - 16 mmol/L Glucose 135 (H) 65 - 100 mg/dL BUN 26 (H) 6 - 23 MG/DL Creatinine 3.09 (H) 0.8 - 1.5 MG/DL  
 GFR est AA 28 (L) >60 ml/min/1.73m2 GFR est non-AA 23 (L) >60 ml/min/1.73m2 Calcium 8.3 8.3 - 10.4 MG/DL  
GLUCOSE, POC Collection Time: 11/03/18  5:49 AM  
Result Value Ref Range Glucose (POC) 147 (H) 65 - 100 mg/dL Chest x-ray pulmonary edema Assessment:  
 
Principal Problem: 
  Hemorrhagic stroke ([facility] (11/2/2018) Active Problems: Morbid obesity ([facility] (11/2/2018) HTN (hypertension) (11/2/2018) HLD (hyperlipidemia) (11/2/2018) Hyperglycemia (11/2/2018) Acute respiratory failure with hypoxia and hypercarbia ([facility] (11/2/2018) ZENAIDA (acute kidney injury) ([facility] (11/3/2018) Plan: This is an unfortunate gentleman with a history of CKD stage 3 (based on [facility] labs into 2016), stable for several years admitted with a hemorrhagic CVA and oliguric ZENAIDA. He does appear to be volume overloaded. ZENAIDA is most likely secondary to rapid control of HTN which unfortunately is necessary given his presentation. I suspect his renal function will continue to decline before it improves. Will start high dose IV Lasix and depending on response this may delay initiation of dialysis. Will check urine studies and an ultrasound. Thank you for the kind courtesy of this consultation. Will make further adjustments to the medical regimen as the clinical course dictates and follow very closely with you. Signed By: Justine Caballero MD   
 November 3, 2018

## 2018-11-03 NOTE — PROGRESS NOTES
Bedside shift report received from [address]. Dual neuro assessment completed at handoff, patient responds to voice with intermittent command following on the right side, pupils are round, slightly responsive to light, minimal movement on left lower extremity, left arm is flaccid. Patient is on vent, PRVC at 70% FiO2, abdomen is obese with hypoactive bowl sounds, OG tube is at 60cm, patent and infusing. Alvarado is patent and draining. Orders received to begin Nitro gtt to maintain BP <140, Propofol gtt at 10mcg/kg/min, Cardene at 15mg/hr, patient is normal sinus rhythm on the monitor,  Last BP> 140, all other vital signs stable, will continue to monitor.

## 2018-11-03 NOTE — PROGRESS NOTES
Ventilator check complete; patient has a #7.5 ET tube secured at the 24 at the teeth. Patient is sedated. Patient is not able to follow commands. Breath sounds are diminished. Trachea is midline, Negative for subcutaneous air, and chest excursion is symmetric. Patient is also Negative for cyanosis and is Negative for pitting edema. All alarms are set and audible. Resuscitation bag is at the head of the bed. Ventilator Settings Mode FIO2 Rate Tidal Volume Pressure PEEP I:E Ratio PRVC  70 %   18 500 ml     8 cm H20 Peak airway pressure: 28 cm H2O Minute ventilation: 11 l/min [address], RT

## 2018-11-03 NOTE — PROGRESS NOTES
SBP greater than 140 with Cardene at max rate. Dr. James Fuentes notified, orders received for nitro gtt.

## 2018-11-03 NOTE — PROGRESS NOTES
Ventilator check complete; patient has a #7.5 ET tube secured at the 24 at the teeth. Patient is not sedated. Patient is able to follow commands. Breath sounds are coarse. Trachea is midline, Negative for subcutaneous air, and chest excursion is symmetric. Patient is also Negative for cyanosis and is Negative for pitting edema. All alarms are set and audible. Resuscitation bag is at the head of the bed. Ventilator Settings Mode FIO2 Rate Tidal Volume Pressure PEEP I:E Ratio PRVC  70 %    500 ml     8 cm H20 Peak airway pressure: 28 cm H2O Minute ventilation: 11.2 l/min ABG: No results for input(s): PH, PCO2, PO2, HCO3 in the last 72 hours.  
 
 
Timbo Orta, RT

## 2018-11-03 NOTE — PROGRESS NOTES
Care Daily Progress Note: 11/3/2018 Admission Date: 11/2/2018 The patient's chart is reviewed and the patient is discussed with the staff. 
 
48 y.o. male with a PMH of hypertension (reportedly off medications) who presents with syncope, headache, slurred speech and left-sided hemiparesis which developed while at work early this morning. He was found to have a large 4.5cmm R frontal intraparenchymal hemorrhage with 5mm shift from L to R. Neurosurgery reviewed with Dr. Aki Stanley and recommended nonsurgical medical management. 
  
In the ER he has had hypertension with SBP of 208/102 initially and was started on nicardipine at 10mg. The patient was intubated due to vomiting with heavy oral secretions and  poor airway protection. He was initially awake, though lethargic. Teleneurology evaluated and recommended SBP goal of 140/90 Subjective:  
 
Sedated with propofol,  maxed out on cardene, ntg being added Current Facility-Administered Medications Medication Dose Route Frequency  0.9% sodium chloride infusion  50 mL/hr IntraVENous CONTINUOUS  
 simvastatin (ZOCOR) tablet 40 mg  40 mg Per NG tube QHS  nitroglycerin (Tridil) 200 mcg/ml infusion  0-20 mcg/min IntraVENous TITRATE  potassium, sodium phosphates (NEUTRA-PHOS) packet 1 Packet  1 Packet Per G Tube Q6H  
 niCARdipine in Saline (CARDENE) 25 MG/250 mL infusion kit  0-15 mg/hr IntraVENous TITRATE  propofol (DIPRIVAN) infusion  0-50 mcg/kg/min IntraVENous TITRATE  sodium chloride (NS) flush 5-10 mL  5-10 mL IntraVENous Q8H  
 sodium chloride (NS) flush 5-10 mL  5-10 mL IntraVENous PRN  
 glycopyrrolate (ROBINUL) injection 0.1 mg  0.1 mg IntraVENous Q4H PRN  
 insulin regular (NOVOLIN R, HUMULIN R) injection   SubCUTAneous Q6H  
 tuberculin injection 5 Units  5 Units IntraDERMal ONCE  pantoprazole (PROTONIX) 40 mg in sodium chloride 0.9% 10 mL injection  40 mg IntraVENous DAILY  NUTRITIONAL SUPPORT ELECTROLYTE PRN ORDERS   Does Not Apply PRN  niCARdipine (CARDENE) 100 mg in 0.9% sodium chloride 250 mL infusion  0-15 mg/hr IntraVENous TITRATE Review of Systems Unobtainable due to patient status. Objective:  
 
Vitals:  
 11/03/18 3050 11/03/18 4294 11/03/18 5526 11/03/18 9627 BP: 131/66 127/60 129/63 Pulse: 60 60 60 62 Resp: 20 18 18 17 Temp:      
SpO2: 94% 94% 94% 91% Weight:      
 
 
Intake and Output:  
11/01 1901 - 11/03 0700 In: 3644.3 [I.V.:2819.3] Out: Milagro Zamora No intake/output data recorded. Physical Exam:         
Constitutional:  intubated and mechanically ventilated. EENMT:  Sclera clear, pupils equal, oral mucosa moist 
Respiratory:clear Cardiovascular:  RRR with no M,G,R; 
Gastrointestinal:  soft with no tenderness; positive bowel sounds present Musculoskeletal:  warm with no cyanosis, no lower leg edema Skin:  no jaundice or ecchymosis Neurologic: no gross neuro deficits Psychiatric:  sedated LINES:   
ETT, peripheral 
 
DRIPS:   
Cardene, propofol CXR:   
 
 
Ventilator Settings Mode FIO2 Rate Tidal Volume Pressure PEEP PRVC  70 %    500 ml     8 cm H20 Peak airway pressure: 28 cm H2O Minute ventilation: 11 l/min ABG:  
Recent Labs 11/03/18 
0320 11/02/18 
0518 PHI 7.500* 7.272* PCO2I 34.2* 62.2*  
PO2I 68* 99 HCO3I 26.7* 28.7* LAB Recent Labs 11/03/18 
5940 11/03/18 
9078 11/02/18 
1716 11/02/18 
1225 11/02/18 
6540 GLUCPOC 147* 131* 120* 108* 129* Recent Labs 11/02/18 
0346 WBC 7.3 HGB 15.1 HCT 47.4  INR 1.1 Recent Labs 11/03/18 
8474 11/02/18 
8276  140  
K 3.8 3.8  105 CO2 27 27 * 103* BUN 26* 22  
CREA 3.09* 2.59* MG 2.1  --   
PHOS 1.6*  --   
CA 8.3 8.5 ALB  --  3.7 SGOT  --  13* No results for input(s): LCAD, LAC in the last 72 hours. Assessment:  (Medical Decision Making) Hospital Problems  Never Reviewed Codes Class Noted POA ZENAIDA [facility]acute [facility]) ([facility] ICD-10-CM: N17.9 ICD-9-CM: 584.9  11/3/2018 Unknown  
 worse * (Principal) Hemorrhagic stroke ([facility] ICD-10-CM: I61.9 ICD-9-CM: 009  11/2/2018 Yes Morbid obesity ([facility] ICD-10-CM: E66.01 
ICD-9-CM: 278.01  11/2/2018 Yes HTN (hypertension) ICD-10-CM: I10 
ICD-9-CM: 401.9  11/2/2018 Yes Requiring iv meds for control HLD (hyperlipidemia) ICD-10-CM: E78.5 ICD-9-CM: 272.4  11/2/2018 Yes Hyperglycemia ICD-10-CM: R73.9 ICD-9-CM: 790.29  11/2/2018 Unknown Acute respiratory failure with hypoxia and hypercarbia (HCC) ICD-10-CM: J96.01, J96.02 
ICD-9-CM: 518.81  11/2/2018 Unknown Critically ill - increased fio2 Plan:  (Medical Decision Making) 1   Vent support- will need to increase peep-decrease fio2 if possible 2   Repeat head ct 3   Neurology evaluating 4   Cardene, kirk 
-- 
Critical care time 43 minutes More than 50% of the time documented was spent in face-to-face contact with the patient and in the care of the patient on the floor/unit where the patient is located.  
 
Eyal Brown MD

## 2018-11-03 NOTE — PROGRESS NOTES
NS @ 50 ml/hr ordered per Dr. Marilyn Oliva (Cardene gtt concentration changed by pharmacist, patient now getting less fluid per hour) Urine output remains around 20 ml/hr.

## 2018-11-03 NOTE — PROGRESS NOTES
Accompanied patient to CT, on monitor with RT monitoring vent, patient tolerated procedure well, vital signs stable, will continue to monitor.

## 2018-11-03 NOTE — PROGRESS NOTES
Gastric residual at 500mL - tube feedings on hold, called dietary supervisor, someone to come by today to readdress, will continue to monitor.

## 2018-11-03 NOTE — H&P
Date of Surgery Update: 
Canelo Arana was seen and examined. History and physical has been reviewed. The patient has been examined.  There have been no significant clinical changes since the completion of the originally dated History and Physical. 
 
Signed By: Azucena Taylor MD   
 November 3, 2018 12:56 PM

## 2018-11-03 NOTE — PROGRESS NOTES
Urine output dropping to around 20 ml/hr. Patient has been on max rate of Cardene, therefore receiving fluids at 150 ml/hr. Dr. Anita Ramos notified, order received for nephrology consult.

## 2018-11-03 NOTE — PROGRESS NOTES
Ventilator check complete; patient has a #7.5 ET tube secured at the 24 at the teeth. Patient is sedated. Patient is not able to follow commands. Breath sounds are diminished. Trachea is midline, Negative for subcutaneous air, and chest excursion is symmetric. Patient is also Negative for cyanosis and is Negative for pitting edema. All alarms are set and audible. Resuscitation bag is at the head of the bed. Ventilator Settings Mode FIO2 Rate Tidal Volume Pressure PEEP I:E Ratio PRVC  45 %    500 ml     12 cm H20 Peak airway pressure: 29 cm H2O Minute ventilation: 9.2 l/min ABG: No results for input(s): PH, PCO2, PO2, HCO3 in the last 72 hours.  
 
 
Timbo Orta, RT

## 2018-11-03 NOTE — PROCEDURES
The pt was placed in the supine position The R neck was prepped and drapped 1% lidocaine was injected locally With ultrasound guidance the R ij was accessed with 18 gauge needle and a wire guide was placed through the needle A quad lumen catheter was then placed using Seldinger technique Good blood return

## 2018-11-03 NOTE — PROGRESS NOTES
On call specialist for Neurology called and confirmed the plan for MRI/MRA to be completed Monday per Dr. Primo Sheppard recommendation, repeat CT today.

## 2018-11-03 NOTE — PROGRESS NOTES
Verbal bedside report received from CASTILLO ANDERSEN. Shift assessment completed. Patient is sedated. Does not open eyes. Pupils are round, sluggish, minimal response to light, ~2 mm. Strong cough. Occasionally follows commands on R side, minimal spontaneous movement to LLE. Flaccid LUE with no response to stimulus. Sinus rhythm to sinus lillie on monitor, BP controlled with Cardene. Lung sounds diminished, 02 sats low 90's on 70% Fi02. Abdomen obese, semi-soft, hypoactive bowel sounds, currently tolerating TF via OGT. Alvarado draining yellow urine with sediment. SCDs in place. No family present. Lines: 
#18 L AC #20 L Hand Drips: 
Propofol @ 20 mcg/kg/min Cardene @ 15 mg/hr Drains: 
Alvarado Airway: #7.5 ETT @ 24 cm teeth

## 2018-11-04 NOTE — PROGRESS NOTES
Received bedside shift report from [address]. Patient responds to pain, and withdraws on right side, however does not open eyes, pupils are 2mm and fixed, patient on vent PRVC at 45% FiO2, bilateral lung sounds are coarse, OG tube at 60cm with gastric residual at 200mL from tube feeding, abdomen is obese, semi-soft with active bowel sounds, patient is in soft wrist restraints, sinus lillie/sinus rhythm on the monitor,deras is patent and draining, will continue to monitor. Fentanyl 50 mcg/hr Lasix 20 mg/hr Cardene 15 mg/hr Nitroglycerin 20 mcg/hr Propofol 15 mcg/kg/min

## 2018-11-04 NOTE — PROGRESS NOTES
Spoke with Dr. Jae Brizuela, patient has elevated BP and HR, no PRN's can be given at this time, increased Fentanyl and Propofol,  current Nitro order is from 0-20mcg/hr, orders received to modify order to 0-200mcg/hr.

## 2018-11-04 NOTE — PROGRESS NOTES
Notified family POC, Guille Davila, pt not with withdrawing from pain, no gag, no corneal reflex, low BP, heart rate dropping.

## 2018-11-04 NOTE — PROGRESS NOTES
Spoke with Dr. Onur Roth, patient's BP continuing to rise, titrating Nitroglycerin accordingly. Currently receiving  Nitro 80 mcg/min, Propofol 15 mcg/min, Fentanyl 100 mcg/hr, Cardene 15 mg/hr. Pupils are fixed at 4 cm, tube feedings on hold for high  gastric residuals , no gag when suctioned, no corneal reflex. Patient will respond to pain when sternal rubbed, and will withdraw from pain on lower extremities. Patients heart rate in the 90's, in and out of trigeminy, labs reviewed, no current electrolyte imbalance. No CT ordered at this time, continue to titrate with Nitro, new orders for PRN lopressor if needed.

## 2018-11-04 NOTE — PROGRESS NOTES
Ventilator check complete; patient has a #7.5 ET tube secured at the 24 at the lip. Patient is sedated. Patient is not able to follow commands. Breath sounds are coarse. Trachea is midline, Negative for subcutaneous air, and chest excursion is symmetric. Patient is also Negative for cyanosis and is Negative for pitting edema. All alarms are set and audible. Resuscitation bag is at the head of the bed. Ventilator Settings Mode FIO2 Rate Tidal Volume Pressure PEEP I:E Ratio PRVC  45 %   18 500 ml     12 cm H20 Peak airway pressure: 30 cm H2O Minute ventilation: 9.1 l/min [address], [place]

## 2018-11-04 NOTE — PROGRESS NOTES
SBP > 140 despite nitro and Cardene at max rate. Order received from Dr. Reynaldo Barrera for 10 mg IV Hydralazine q 6 hours.

## 2018-11-04 NOTE — PROGRESS NOTES
Pt vent setting were changed due to change in pt status, PRVC R18, PEEP 14, , FIO2 100%, Dr Elisha Yuan aware.

## 2018-11-04 NOTE — PROGRESS NOTES
Care Daily Progress Note: 11/4/2018 Admission Date: 11/2/2018 The patient's chart is reviewed and the patient is discussed with the staff. 
 
48 y. o. male with a PMH of hypertension (reportedly off medications) who presents with syncope, headache, slurred speech and left-sided hemiparesis which developed while at work early this morning. Dianne Carmen was found to have a large 4.5cmm R frontal intraparenchymal hemorrhage with 5mm shift from L to R.  Neurosurgery reviewed with Dr. Xiao Mojica and recommended nonsurgical medical management. 
  
In the ER he has had hypertension with SBP of 208/102 initially and was started on nicardipine at 10mg.  The patient was intubated due to vomiting with heavy oral secretions and  poor airway protection.  He was initially awake, though lethargic.  Teleneurology evaluated and recommended SBP goal of 140/90 Subjective:  
 
Ongoing bp problems- getting cardene and ntg Lasix drip per nephro- I/o 3100/3575 Current Facility-Administered Medications Medication Dose Route Frequency  hydrALAZINE (APRESOLINE) 20 mg/mL injection 10 mg  10 mg IntraVENous Q6H PRN  
 fentaNYL in normal saline (pf) 25 mcg/mL infusion   mcg/hr IntraVENous TITRATE  simvastatin (ZOCOR) tablet 40 mg  40 mg Per NG tube QHS  nitroglycerin (Tridil) 200 mcg/ml infusion  0-20 mcg/min IntraVENous TITRATE  potassium, sodium phosphates (NEUTRA-PHOS) packet 1 Packet  1 Packet Per G Tube Q6H  
 furosemide (LASIX) 100 mg in 0.9% sodium chloride 100 mL infusion  20 mg/hr IntraVENous CONTINUOUS  
 influenza vaccine 2018-19 (6 mos+)(PF) (FLUARIX QUAD/FLULAVAL QUAD) injection 0.5 mL  0.5 mL IntraMUSCular PRIOR TO DISCHARGE  niCARdipine in Saline (CARDENE) 25 MG/250 mL infusion kit  0-15 mg/hr IntraVENous TITRATE  propofol (DIPRIVAN) infusion  0-50 mcg/kg/min IntraVENous TITRATE  sodium chloride (NS) flush 5-10 mL  5-10 mL IntraVENous Q8H  
  sodium chloride (NS) flush 5-10 mL  5-10 mL IntraVENous PRN  
 glycopyrrolate (ROBINUL) injection 0.1 mg  0.1 mg IntraVENous Q4H PRN  
 insulin regular (NOVOLIN R, HUMULIN R) injection   SubCUTAneous Q6H  
 pantoprazole (PROTONIX) 40 mg in sodium chloride 0.9% 10 mL injection  40 mg IntraVENous DAILY  NUTRITIONAL SUPPORT ELECTROLYTE PRN ORDERS   Does Not Apply PRN  niCARdipine (CARDENE) 100 mg in 0.9% sodium chloride 250 mL infusion  0-15 mg/hr IntraVENous TITRATE Review of Systems Unobtainable due to patient status. Objective:  
 
Vitals:  
 11/04/18 3225 11/04/18 0717 11/04/18 0732 11/04/18 4588 BP:  136/64 138/67 135/65 Pulse: 60 60 63 (!) 59 Resp: 18 18 18 18 Temp:      
SpO2: 93% 93% 93% 93% Weight:      
 
 
Intake and Output:  
11/02 1901 - 11/04 0700 In: 5458.2 [I.V.:3739.2] Out: 3795 [IXEKO:0264] No intake/output data recorded. Physical Exam:         
Constitutional:  intubated and mechanically ventilated. EENMT:  Sclera clear, pupils equal, oral mucosa moist 
Respiratory: clear Cardiovascular:  RRR with no M,G,R; 
Gastrointestinal:  soft with no tenderness; positive bowel sounds present Musculoskeletal:  warm with no cyanosis, no lower leg edema Skin:  no jaundice or ecchymosis Neurologic: no gross neuro deficits Psychiatric:  Sedated but responds some LINES:   
ETT 
 
DRIPS:   
Cardene, propofol, fentanyl, ntg CXR:   
 
 
Ventilator Settings Mode FIO2 Rate Tidal Volume Pressure PEEP PRVC  45 %    500 ml     12 cm H20 Peak airway pressure: 30 cm H2O Minute ventilation: 9.1 l/min ABG:  
Recent Labs 11/03/18 
0320 11/02/18 
0518 PHI 7.500* 7.272* PCO2I 34.2* 62.2*  
PO2I 68* 99 HCO3I 26.7* 28.7* LAB Recent Labs 11/04/18 
1215 11/04/18 
0027 11/03/18 
1708 11/03/18 
1138 11/03/18 
4255 GLUCPOC 120* 139* 105* 128* 147* Recent Labs 11/04/18 
0327 11/02/18 
2963 WBC 14.1* 7.3 HGB 14.5 15.1 HCT 44.8 47.4  210 INR  --  1.1 Recent Labs 11/04/18 
0327 11/03/18 
7841 11/02/18 
8133  142 140  
K 3.8 3.8 3.8  107 105 CO2 26 27 27 * 135* 103* BUN 35* 26* 22  
CREA 3.56* 3.09* 2.59* MG 2.2 2.1  --   
PHOS 3.9 1.6*  --   
CA 8.2* 8.3 8.5 ALB  --   --  3.7 SGOT  --   --  13* No results for input(s): LCAD, LAC in the last 72 hours. Assessment:  (Medical Decision Making) Hospital Problems  Never Reviewed Codes Class Noted POA ZENAIDA (acute kidney injury) ([facility] ICD-10-CM: N17.9 ICD-9-CM: 584.9  11/3/2018 Unknown Good output but rising cr * (Principal) Hemorrhagic stroke ([facility] ICD-10-CM: I61.9 ICD-9-CM: 433  11/2/2018 Yes No change on ct from yesterday Morbid obesity ([facility] ICD-10-CM: E66.01 
ICD-9-CM: 278.01  11/2/2018 Yes HTN (hypertension) ICD-10-CM: I10 
ICD-9-CM: 401.9  11/2/2018 Yes HLD (hyperlipidemia) ICD-10-CM: E78.5 ICD-9-CM: 272.4  11/2/2018 Yes Hyperglycemia ICD-10-CM: R73.9 ICD-9-CM: 790.29  11/2/2018 Unknown Acute respiratory failure with hypoxia and hypercarbia (HCC) ICD-10-CM: J96.01, J96.02 
ICD-9-CM: 518.81  11/2/2018 Unknown Remains on vent support Plan:  (Medical Decision Making) 1   Cardene, ntg 
2   ? Add po meds 3    Vent support 
4    Lasix drip per nephrology 5    Mri tomorrow 
-- 
 
More than 50% of the time documented was spent in face-to-face contact with the patient and in the care of the patient on the floor/unit where the patient is located.  
 
Azucena Taylor MD

## 2018-11-04 NOTE — PROGRESS NOTES
BP with sudden drop, all gtts stopped, not withdrawing from pain, no gag reflex on suction with lavage, heart rate dropping from 90's down. Dr. Mainor Cheek notified of recent changes, new orders for ABG, chest xray, and labs at this time.

## 2018-11-04 NOTE — PROGRESS NOTES
BP again elevated. Dr. Titus Gomez notified, will try treating for pain with 50 mcg Fentanyl and to start gtt if BP successfully lowered. Fentanyl given, SBP now 134, fentanyl gtt ordered.

## 2018-11-04 NOTE — PROGRESS NOTES
Called due change in pt. Status- he had been requiring multiple antihypertensives then suddenly bp dropped and  All antihypertensives were stopped  
bp now in 80Z systolic 
cxr  Et tube appears high, some basilar atelectasis 
worsening gas exchange-now on fio2 100 - 7.36 /49/102-  Vent rate increased Pupils fixed Pt is unresponsive off sedation

## 2018-11-04 NOTE — PROGRESS NOTES
Cental line found partially pulled out. Dressing intact and in place. No blood return. Peripheral IV's obtained, MD notified. Central line removed with no complications.

## 2018-11-04 NOTE — PROGRESS NOTES
Problem: Falls - Risk of 
Goal: *Absence of Falls Document Sudha Anais Fall Risk and appropriate interventions in the flowsheet. Outcome: Progressing Towards Goal 
Fall Risk Interventions: 
  
 
Mentation Interventions: Adequate sleep, hydration, pain control, Bed/chair exit alarm, Door open when patient unattended, Evaluate medications/consider consulting pharmacy, Increase mobility, More frequent rounding, Reorient patient, Toileting rounds, Update white board Medication Interventions: Bed/chair exit alarm, Evaluate medications/consider consulting pharmacy Elimination Interventions: Bed/chair exit alarm, Call light in reach, Toileting schedule/hourly rounds Problem: Pressure Injury - Risk of 
Goal: *Prevention of pressure injury Document Bob Scale and appropriate interventions in the flowsheet. Outcome: Progressing Towards Goal 
Pressure Injury Interventions: 
Sensory Interventions: Assess changes in LOC, Assess need for specialty bed, Avoid rigorous massage over bony prominences, Check visual cues for pain, Discuss PT/OT consult with provider, Float heels, Keep linens dry and wrinkle-free, Maintain/enhance activity level, Minimize linen layers, Monitor skin under medical devices, Pad between skin to skin, Pressure redistribution bed/mattress (bed type), Turn and reposition approx. every two hours (pillows and wedges if needed), Use 30-degree side-lying position Activity Interventions: Assess need for specialty bed, Pressure redistribution bed/mattress(bed type) Mobility Interventions: Assess need for specialty bed, Float heels, HOB 30 degrees or less, Pressure redistribution bed/mattress (bed type), Turn and reposition approx. every two hours(pillow and wedges) Nutrition Interventions: Document food/fluid/supplement intake Friction and Shear Interventions: Apply protective barrier, creams and emollients, Feet elevated on foot rest, Foam dressings/transparent film/skin sealants, HOB 30 degrees or less, Lift sheet, Lift team/patient mobility team, Minimize layers, Transferring/repositioning devices

## 2018-11-04 NOTE — PROCEDURES
PROCEDURE Bronchoscopy with airway inspection/cleanout/ 
 
INDICATION Respiratory failure EQUIPMENT: 
ambu bronchoscope. ANESTHESIA Leoncio Teran Please see ICU/CCU/CTICU medication record. AIRWAY INSPECTION After obtaining informed consent, using a bite block/ET tube adapter, an ambu bronch video/fiberoptic bronchoscope was  introduced into the trachea through the vocal chords/ET tube, without complication. RIGHT 
 
LOCATION NORM/ABNORM DESCRIPTION  
VOCAL CORDS  Not seen TRACHEA  Et tube tip proximal trachea AYLEEN NL   
RMSB NL   
RUL NL   
BI NL   
RML NL   
SUP SEGM RLL NL Moderate amount of secretions removed with saline MED BASAL NL   
ANTERIOR BASAL NL   
LATERAL BASAL NL   
POSTERIOR BASAL NL   
 
      
 
 
 
 
 
 
 
 
 
 
 
 
 
 
     LEFT 
 
LOCATION NORMAL/ABNORMAL TYPE  
LMSB NL   
MIKHAIL NL   
LINGULA NL   
SUPERIOR DIVISION NL   
SUPERIOR SEG LLL NL   
MIS-MEDIAL LLL NL   
LATERAL LLL NL   
POSTERIOR LLL NL The following samples were not obtained: 
 
Samples were not sent The procedure was completed  without complication and the patient tolerated the procedure well. Estimated blood loss-  0 to minimum Recommendations: 
Advance et tube 3 cm Cindy Adams MD

## 2018-11-04 NOTE — PROGRESS NOTES
Care Daily Progress Note: 11/4/2018 Admission Date: 11/2/2018 The patient's chart is reviewed and the patient is discussed with the staff. Called due change in pt. Status- he had been requiring multiple antihypertensives then suddenly bp dropped and  All antihypertensives were stopped  
bp now in 11Q systolic 
cxr  Et tube appears high, some basilar atelectasis 
worsening gas exchange-now on fio2 100 - 7.36 /49/102-  Vent rate increased Pupils fixed Pt is unresponsive off sedation Subjective:  
 
  
 
Current Facility-Administered Medications Medication Dose Route Frequency  hydrALAZINE (APRESOLINE) 20 mg/mL injection 10 mg  10 mg IntraVENous Q6H PRN  
 fentaNYL in normal saline (pf) 25 mcg/mL infusion   mcg/hr IntraVENous TITRATE  hydrALAZINE (APRESOLINE) 20 mg/mL injection 20 mg  20 mg IntraVENous Q6H  
 amLODIPine (NORVASC) tablet 5 mg  5 mg Oral BID  metoprolol (LOPRESSOR) injection 5 mg  5 mg IntraVENous Q6H PRN  
 NOREPINephrine (LEVOPHED) 4 mg/250 mL (16 mcg/mL) in NS infusion  0-16 mcg/min IntraVENous TITRATE  mannitol 25 % injection 75 g  75 g IntraVENous ONCE  
 simvastatin (ZOCOR) tablet 40 mg  40 mg Per NG tube QHS  nitroglycerin (Tridil) 200 mcg/ml infusion  0-200 mcg/min IntraVENous TITRATE  influenza vaccine 2018-19 (6 mos+)(PF) (FLUARIX QUAD/FLULAVAL QUAD) injection 0.5 mL  0.5 mL IntraMUSCular PRIOR TO DISCHARGE  niCARdipine in Saline (CARDENE) 25 MG/250 mL infusion kit  0-15 mg/hr IntraVENous TITRATE  propofol (DIPRIVAN) infusion  0-50 mcg/kg/min IntraVENous TITRATE  sodium chloride (NS) flush 5-10 mL  5-10 mL IntraVENous Q8H  
 sodium chloride (NS) flush 5-10 mL  5-10 mL IntraVENous PRN  
 glycopyrrolate (ROBINUL) injection 0.1 mg  0.1 mg IntraVENous Q4H PRN  
 insulin regular (NOVOLIN R, HUMULIN R) injection   SubCUTAneous Q6H  
 pantoprazole (PROTONIX) 40 mg in sodium chloride 0.9% 10 mL injection  40 mg IntraVENous DAILY  NUTRITIONAL SUPPORT ELECTROLYTE PRN ORDERS   Does Not Apply PRN  niCARdipine (CARDENE) 100 mg in 0.9% sodium chloride 250 mL infusion  0-15 mg/hr IntraVENous TITRATE Review of Systems Unobtainable due to patient status. Objective:  
 
Vitals:  
 11/04/18 1744 11/04/18 1747 11/04/18 1749 11/04/18 1754 BP: 93/53 94/55 93/51 Pulse: 73 70 72 69 Resp: 18 19 (!) 33 18 Temp:      
SpO2: 94% 94% 91% 95% Weight:      
 
 
Intake and Output:  
11/02 1901 - 11/04 0700 In: 5458.2 [I.V.:3739.2] Out: 3795 [ODSFV:3131] 11/04 0701 - 11/04 1900 In: 1412.9 [I.V.:808.9] Out: 1725 [ZKPJH:1525] Physical Exam:         
Constitutional:  intubated and mechanically ventilated. EENMT:  Sclera clear, pupils equal, oral mucosa moist 
Respiratory: rhonchi 
Cardiovascular:  RRR with no M,G,R; 
Gastrointestinal:  soft with no tenderness; positive bowel sounds present Musculoskeletal:  warm with no cyanosis, no lower leg edema Skin:  no jaundice or ecchymosis Neurologic: no gross neuro deficits Psychiatric:  unresponsive LINES:   
ETT 
 
DRIPS:   
Now off drips CXR:   
 
 
Ventilator Settings Mode FIO2 Rate Tidal Volume Pressure PEEP PRVC  100 %(due to change in pt O2 status)    510 ml     14 cm H20 Peak airway pressure: 27 cm H2O Minute ventilation: 9.4 l/min ABG:  
Recent Labs 11/04/18 1735 11/03/18 0320 11/02/18 
0518 PHI 7.361 7.500* 7.272* PCO2I 49.2* 34.2* 62.2*  
PO2I 102* 68* 99 HCO3I 27.9* 26.7* 28.7* LAB Recent Labs 11/04/18 1736 11/04/18 0421 34 84 07 11/04/18 
0517 11/04/18 
0027 11/03/18 
1708 GLUCPOC 148* 131* 120* 139* 105* Recent Labs 11/04/18 1738 11/04/18 
0327 11/02/18 
2772 WBC 12.5* 14.1* 7.3 HGB 13.3* 14.5 15.1 HCT 41.5 44.8 47.4  232 210 INR  --   --  1.1 Recent Labs 11/04/18 
1738 11/04/18 
0327 11/03/18 
0768 11/02/18 
6385  142 142 140  
K 3.9 3.8 3.8 3.8  106 107 105 CO2 28 26 27 27 * 137* 135* 103* BUN 43* 35* 26* 22  
CREA 4.07* 3.56* 3.09* 2.59* MG  --  2.2 2.1  --   
PHOS  --  3.9 1.6*  --   
CA 7.8* 8.2* 8.3 8.5 ALB  --   --   --  3.7 SGOT  --   --   --  13* No results for input(s): LCAD, LAC in the last 72 hours. Assessment:  (Medical Decision Making) Hospital Problems  Never Reviewed Codes Class Noted POA ZENAIDA (acute kidney injury) ([facility] ICD-10-CM: N17.9 ICD-9-CM: 584.9  11/3/2018 Unknown * (Principal) Hemorrhagic stroke ([facility] ICD-10-CM: I61.9 ICD-9-CM: 590  11/2/2018 Yes Worsening status- ? Further bleeding, ? herniation Morbid obesity ([facility] ICD-10-CM: E66.01 
ICD-9-CM: 278.01  11/2/2018 Yes HTN (hypertension) ICD-10-CM: I10 
ICD-9-CM: 401.9  11/2/2018 Yes HLD (hyperlipidemia) ICD-10-CM: E78.5 ICD-9-CM: 272.4  11/2/2018 Yes Hyperglycemia ICD-10-CM: R73.9 ICD-9-CM: 790.29  11/2/2018 Unknown Acute respiratory failure with hypoxia and hypercarbia (HCC) ICD-10-CM: J96.01, J96.02 
ICD-9-CM: 518.81  11/2/2018 Unknown Now on 100% -critically ill Plan:  (Medical Decision Making) 1   Mannitol 2   Hyperventilate 3   Levophed added -- 
Critical care time 58 minutes More than 50% of the time documented was spent in face-to-face contact with the patient and in the care of the patient on the floor/unit where the patient is located.  
 
Shena Alvarez MD

## 2018-11-04 NOTE — H&P
Date of Surgery Update: 
Konrad Schulz was seen and examined. History and physical has been reviewed. The patient has been examined.  There have been no significant clinical changes since the completion of the originally dated History and Physical. 
 
Signed By: Bailey Blas MD   
 November 4, 2018 6:33 PM

## 2018-11-04 NOTE — PROGRESS NOTES
Subjective:  
Daily Progress Note: 2018 8:39 AM 
 
Sedated Comfortable No CP No SOB No Abd pain MS - 
 
 
Current Facility-Administered Medications Medication Dose Route Frequency  hydrALAZINE (APRESOLINE) 20 mg/mL injection 10 mg  10 mg IntraVENous Q6H PRN  
 fentaNYL in normal saline (pf) 25 mcg/mL infusion   mcg/hr IntraVENous TITRATE  hydrALAZINE (APRESOLINE) 20 mg/mL injection 20 mg  20 mg IntraVENous Q6H  
 amLODIPine (NORVASC) tablet 5 mg  5 mg Oral BID  simvastatin (ZOCOR) tablet 40 mg  40 mg Per NG tube QHS  nitroglycerin (Tridil) 200 mcg/ml infusion  0-20 mcg/min IntraVENous TITRATE  influenza vaccine - (6 mos+)(PF) (FLUARIX QUAD/FLULAVAL QUAD) injection 0.5 mL  0.5 mL IntraMUSCular PRIOR TO DISCHARGE  niCARdipine in Saline (CARDENE) 25 MG/250 mL infusion kit  0-15 mg/hr IntraVENous TITRATE  propofol (DIPRIVAN) infusion  0-50 mcg/kg/min IntraVENous TITRATE  sodium chloride (NS) flush 5-10 mL  5-10 mL IntraVENous Q8H  
 sodium chloride (NS) flush 5-10 mL  5-10 mL IntraVENous PRN  
 glycopyrrolate (ROBINUL) injection 0.1 mg  0.1 mg IntraVENous Q4H PRN  
 insulin regular (NOVOLIN R, HUMULIN R) injection   SubCUTAneous Q6H  
 pantoprazole (PROTONIX) 40 mg in sodium chloride 0.9% 10 mL injection  40 mg IntraVENous DAILY  NUTRITIONAL SUPPORT ELECTROLYTE PRN ORDERS   Does Not Apply PRN  niCARdipine (CARDENE) 100 mg in 0.9% sodium chloride 250 mL infusion  0-15 mg/hr IntraVENous TITRATE Objective:  
 
Visit Vitals /64 Pulse 63 Temp 98.4 °F (36.9 °C) Resp 20 Wt (!) 173.4 kg (382 lb 3.2 oz) SpO2 92% BMI 53.31 kg/m² O2 Flow Rate (L/min): 3 l/min O2 Device: Endotracheal tube, Ventilator Temp (24hrs), Av.3 °F (36.8 °C), Min:97.8 °F (36.6 °C), Max:98.9 °F (37.2 °C) 701 - 1900 In: -  
Out: 3060 Autumn Hernandez 1901 -  07 In: 5458.2 [I.V.:3739.2] Out: 3795 [IAXMF:1020] Visit Vitals /64 Pulse 63 Temp 98.4 °F (36.9 °C) Resp 20 Wt (!) 173.4 kg (382 lb 3.2 oz) SpO2 92% BMI 53.31 kg/m² Head: Normocephalic, without obvious abnormality Neck: no JVD Lungs: clear to auscultation bilaterally Heart: regular rate and rhythm Abdomen: soft, non-tender. Extremities: tr edema Data Review Recent Results (from the past 48 hour(s)) GLUCOSE, POC Collection Time: 11/02/18  8:42 AM  
Result Value Ref Range Glucose (POC) 129 (H) 65 - 100 mg/dL GLUCOSE, POC Collection Time: 11/02/18 12:25 PM  
Result Value Ref Range Glucose (POC) 108 (H) 65 - 100 mg/dL GLUCOSE, POC Collection Time: 11/02/18  5:16 PM  
Result Value Ref Range Glucose (POC) 120 (H) 65 - 100 mg/dL PLEASE READ & DOCUMENT PPD TEST IN 24 HRS Collection Time: 11/03/18 12:00 AM  
Result Value Ref Range PPD Neg Negative  
 mm Induration 0 mm GLUCOSE, POC Collection Time: 11/03/18 12:27 AM  
Result Value Ref Range Glucose (POC) 131 (H) 65 - 100 mg/dL POC G3 Collection Time: 11/03/18  3:20 AM  
Result Value Ref Range Device: VENT    
 FIO2 (POC) 70 % pH (POC) 7.500 (H) 7.35 - 7.45    
 pCO2 (POC) 34.2 (L) 35 - 45 MMHG  
 pO2 (POC) 68 (L) 75 - 100 MMHG  
 HCO3 (POC) 26.7 (H) 22 - 26 MMOL/L  
 sO2 (POC) 95 95 - 98 % Base excess (POC) 4 mmol/L Mode Pressure regulated volume control Tidal volume 500 ml Set Rate 22 bpm  
 PEEP/CPAP (POC) 8 cmH2O Allens test (POC) YES Inspiratory Time 0.9 sec Site LEFT RADIAL Patient temp. 98.6 Specimen type (POC) ARTERIAL Performed by Michael ALLRED, POC 28 MMOL/L  
PHOSPHORUS Collection Time: 11/03/18  3:38 AM  
Result Value Ref Range Phosphorus 1.6 (L) 2.5 - 4.5 MG/DL MAGNESIUM Collection Time: 11/03/18  3:38 AM  
Result Value Ref Range Magnesium 2.1 1.8 - 2.4 mg/dL METABOLIC PANEL, BASIC Collection Time: 11/03/18  3:38 AM  
Result Value Ref Range Sodium 142 136 - 145 mmol/L Potassium 3.8 3.5 - 5.1 mmol/L Chloride 107 98 - 107 mmol/L  
 CO2 27 21 - 32 mmol/L Anion gap 8 7 - 16 mmol/L Glucose 135 (H) 65 - 100 mg/dL BUN 26 (H) 6 - 23 MG/DL Creatinine 3.09 (H) 0.8 - 1.5 MG/DL  
 GFR est AA 28 (L) >60 ml/min/1.73m2 GFR est non-AA 23 (L) >60 ml/min/1.73m2 Calcium 8.3 8.3 - 10.4 MG/DL  
BNP Collection Time: 11/03/18  3:38 AM  
Result Value Ref Range  pg/mL PROCALCITONIN Collection Time: 11/03/18  3:38 AM  
Result Value Ref Range Procalcitonin 0.1 ng/mL GLUCOSE, POC Collection Time: 11/03/18  5:49 AM  
Result Value Ref Range Glucose (POC) 147 (H) 65 - 100 mg/dL GLUCOSE, POC Collection Time: 11/03/18 11:38 AM  
Result Value Ref Range Glucose (POC) 128 (H) 65 - 100 mg/dL GLUCOSE, POC Collection Time: 11/03/18  5:08 PM  
Result Value Ref Range Glucose (POC) 105 (H) 65 - 100 mg/dL GLUCOSE, POC Collection Time: 11/04/18 12:27 AM  
Result Value Ref Range Glucose (POC) 139 (H) 65 - 100 mg/dL METABOLIC PANEL, BASIC Collection Time: 11/04/18  3:27 AM  
Result Value Ref Range Sodium 142 136 - 145 mmol/L Potassium 3.8 3.5 - 5.1 mmol/L Chloride 106 98 - 107 mmol/L  
 CO2 26 21 - 32 mmol/L Anion gap 10 7 - 16 mmol/L Glucose 137 (H) 65 - 100 mg/dL BUN 35 (H) 6 - 23 MG/DL Creatinine 3.56 (H) 0.8 - 1.5 MG/DL  
 GFR est AA 23 (L) >60 ml/min/1.73m2 GFR est non-AA 19 (L) >60 ml/min/1.73m2 Calcium 8.2 (L) 8.3 - 10.4 MG/DL  
CBC W/O DIFF Collection Time: 11/04/18  3:27 AM  
Result Value Ref Range WBC 14.1 (H) 4.3 - 11.1 K/uL  
 RBC 4.90 4.23 - 5.6 M/uL  
 HGB 14.5 13.6 - 17.2 g/dL HCT 44.8 41.1 - 50.3 % MCV 91.4 79.6 - 97.8 FL  
 MCH 29.6 26.1 - 32.9 PG  
 MCHC 32.4 31.4 - 35.0 g/dL  
 RDW 14.4 % PLATELET 397 472 - 131 K/uL MPV 11.7 9.4 - 12.3 FL ABSOLUTE NRBC 0.00 0.0 - 0.2 K/uL MAGNESIUM Collection Time: 11/04/18  3:27 AM  
Result Value Ref Range Magnesium 2.2 1.8 - 2.4 mg/dL PHOSPHORUS Collection Time: 11/04/18  3:27 AM  
Result Value Ref Range Phosphorus 3.9 2.5 - 4.5 MG/DL  
GLUCOSE, POC Collection Time: 11/04/18  5:17 AM  
Result Value Ref Range Glucose (POC) 120 (H) 65 - 100 mg/dL Assessment Patient Active Problem List  
 Diagnosis Date Noted  ZENAIDA (acute kidney injury) ([facility] 11/03/2018  Hemorrhagic stroke ([facility] 11/02/2018  Morbid obesity ([facility] 11/02/2018  
 HTN (hypertension) 11/02/2018  HLD (hyperlipidemia) 11/02/2018  Hyperglycemia 11/02/2018  Acute respiratory failure with hypoxia and hypercarbia ([facility] 11/02/2018 Problems Addressed by Nephrology ZENAIDA - prerenal 
HTN Respiratory Failure CVA Plan Add IV Hydralazine q6h. Beta blocker limited by intermittent bradycardia. Will start Norvasc down NGT to taper Nicardipine. If HR allows, can consider Catapres. Renal functio worse but excellent diuresis. Stop Lasix gtt as Xray improved. BNP down.

## 2018-11-04 NOTE — PROGRESS NOTES
Verbal bedside report received from Merlin Righter, [place]. Shift assessment completed. Patient is sedated, follows commands occasionally. Does not open eyes or nod appropriately to questions. Pupils round, minimal sluggish response to light, ~2 mm. Does not track or focus with eyes. Moves RUE and RLE, withdraws to LUE. Coughs with suction. NSR to Sinus Gavin Star on monitor. Lung sounds diminished, 02 sat low 90s on 45% Fi02. Productive cough. Yellow, thick, mucous draining from nose. Abdomen semi-soft, obese, hypoactive bowel sounds. Still having higher residuals from TF. Alvarado draining clear yellow urine. SCDs, in place. Family at bedside and updated on plan of care and patient condition. Lines: 
R IJ Quad Lumen Drips: 
Cardene @ 12.5 mg/hr Lasix @ 20 mg/hr Nitro - currently off Propofol @ 15 mcg/kg/min NS @ 50 ml/hr Drains: 
Alvarado Airway: #7.5 ETT @ 24 cm teeth

## 2018-11-05 NOTE — PROGRESS NOTES
Spoke with Neurosurgery, Dr. Iwona Peters, and he came to see patient. Unfortunately, he is meeting the criteria for brain death based on his evaluation (1st physician evaluation). CT done at about 9 PM noted worsening  And likely herniated. Spoke with Dr. Iwona Peters with entire family and aware of current condition likely herniating and appears to be meeting criteria for brain death. Also aware that will have to have family decide about goals of care, eg. DNR status. Also did discuss option for organ donation and that service will likely talk to them soon. 2nd physician evaluation will likely be done in AM, if time permits. Time spent with care tonight was 67 minutes on 11/4/18 Porsha Latham MD

## 2018-11-05 NOTE — PROGRESS NOTES
Nutrition F/U: 
Acknowledge change of status to Comfort Care. TF has been discontinued. No further nutrition interventions are expected. Loren Holm. [facility] 
[phone]

## 2018-11-05 NOTE — PROGRESS NOTES
Tele Neuro completed. Dr. Breezy Cherry states he will call Dr. Ismael Chino to discuss plan of care. Visit report placed on paper chart.

## 2018-11-05 NOTE — PROGRESS NOTES
Spoke with Dr. Rosario Martines and reported patients condition markedly changed about 5:30-6 PM. Reported now unresponsive, with fixed dilated pupils and on pressors and needed to be on 100% FIO2. He event emergently did bronch on the patient since unclear why marked hypoxemia to r/o mucus plugging, etc. Felt may be herniating. He was awaiting to talk to Tele-neurology. Spoke with nursing and they were trying to get in touch with patient's daughter/son since they are next of kin. He does have a lot of family here. Patient started on hyperventilation and given mannitol 75 g. [facility] here and also spoke with them. They would like use to continue aggressive care for now. Told them that currently CAT 1 and that is the current plan. Spoke with tele-neurology and they did see patient and agree that may be herniating and would like CT head. Currently noted BP in the 110's and levaphed only at 2-4 mcg/min now. I also did decrease FIO2 to 60% and saturation at 98%. Noted pupils fixed at 4 with no response to light. No corneal or gag reflex. Noted no response to pain. Breathing at vent rate. CXR pending as well. If herniating will get neurosurgery for ?intervention. They would also like the patient to be on 3% saline. Will get ABG as well now. Time spent with care with multiple parties/test/labs was 40 minutes Yuan Minaya MD

## 2018-11-05 NOTE — PROGRESS NOTES
Naif Sandoval from [facility] is reviewing the case in the unit, possible organ donor.

## 2018-11-05 NOTE — PROGRESS NOTES
Callback request from family & staff. Pt is in brain-dead assessment process; spiritual care consulted to support family for the initial assessment test results. Ministry of prayer & presence to demonstrate caring & concern, convey emotional & spiritual support.  
 
iris Talley MDiv,M,PhD

## 2018-11-05 NOTE — CONSULTS
Neurology Surgery Consult Subjective: 
 
 
 
  
  
Renato Brody is a 48 y.o. who presented on 11/2 with a large right BG ICH with extension into temporal lobe. Apparently, NSGY was consulted at the time and determined that patient was not candidate for surgery however no official NSGY consult note can be found in the chart. Patient has been managed in the ICU since then and has been intubated and comatose. Some of the nurses notes mention that the patient was intermittently following commands however neurology notes do not mention any commands. This early AM, patient noted to be GCS 8 and stable according to nurse however later this AM GCS down to 6. Around 4pm this afternoon, GCS down to 3 and patient noted to have bilateral 4mm fixed pupils with no brainstem reflexes or any other neuro response to stimulus. His vital signs were too unstable and he was not able to be brought down to CT until around 9pm. I was contacted after CT scan was completed. I personally reviewed the CT and it shows significant diffuse cerebral edema with loss of gray/white differentiation, effacement of basal cisterns and fourth ventricle, with evidence of new midbrain hemorrhage which could represent a Duret hemorrhage. I then came to evaluate patient. Patient Active Problem List  
 Diagnosis Date Noted  ZENAIDA (acute kidney injury) ([address] 11/03/2018  Hemorrhagic stroke ([address] 11/02/2018  Morbid obesity ([address] 11/02/2018  
 HTN (hypertension) 11/02/2018  HLD (hyperlipidemia) 11/02/2018  Hyperglycemia 11/02/2018  Acute respiratory failure with hypoxia and hypercarbia ([address] 11/02/2018 Jose Zhao MD 
 
  
Past Medical History:  
Diagnosis Date  Hemorrhagic stroke ([address] 11/2/2018  Hypertension  Other ill-defined conditions(799.89) cholesterol  Stroke [facility])   
 about 7 yrs ago No past surgical history on file. No Known Allergies No family history on file. Current Facility-Administered Medications Medication Dose Route Frequency  NOREPINephrine (LEVOPHED) 4 mg/250 mL (16 mcg/mL) in NS infusion  0-16 mcg/min IntraVENous TITRATE  sodium chloride (NS) flush 5-10 mL  5-10 mL IntraVENous Q8H  
 sodium chloride (NS) flush 5-10 mL  5-10 mL IntraVENous PRN  
 3% sodium chloride (*HIGH ALERT*CONCENTRATED IV*) infusion  30 mL/hr IntraVENous CONTINUOUS  
 simvastatin (ZOCOR) tablet 40 mg  40 mg Per NG tube QHS  influenza vaccine 2018-19 (6 mos+)(PF) (FLUARIX QUAD/FLULAVAL QUAD) injection 0.5 mL  0.5 mL IntraMUSCular PRIOR TO DISCHARGE  sodium chloride (NS) flush 5-10 mL  5-10 mL IntraVENous Q8H  
 sodium chloride (NS) flush 5-10 mL  5-10 mL IntraVENous PRN  
 glycopyrrolate (ROBINUL) injection 0.1 mg  0.1 mg IntraVENous Q4H PRN  
 insulin regular (NOVOLIN R, HUMULIN R) injection   SubCUTAneous Q6H  
 pantoprazole (PROTONIX) 40 mg in sodium chloride 0.9% 10 mL injection  40 mg IntraVENous DAILY  NUTRITIONAL SUPPORT ELECTROLYTE PRN ORDERS   Does Not Apply PRN Review of Systems: unable to obtain, pt comatose Objective:  
 
Patient Vitals for the past 8 hrs: 
 BP Temp Pulse Resp SpO2  
11/04/18 2317 125/70  75 24 97 % 11/04/18 2301 126/71  74 24 98 % 11/04/18 2253   76 24 100 % 11/04/18 2247 117/65  75 24 96 % 11/04/18 2232 120/66  75 24 96 % 11/04/18 2217 114/61  64 24 96 % 11/04/18 2201 116/63  68 24 96 % 11/04/18 2147 112/61  70 24 96 % 11/04/18 2132 107/58  (!) 53 24 95 % 11/04/18 2117 96/52  (!) 51 24 91 % 11/04/18 2103 94/50  (!) 47  92 % 11/04/18 2037   (!) 55 24 96 % 11/04/18 2032 116/65  (!) 55 23 98 % 11/04/18 2001 115/62  (!) 52 24 99 % 11/04/18 1947 111/60  (!) 54 24 98 % 11/04/18 1939 105/56  (!) 55 24 97 % 11/04/18 1932 103/57  (!) 53 24 98 % 11/04/18 1920   (!) 53 24 97 % 11/04/18 1917 102/56  (!) 54 24 97 % 11/04/18 1904 103/57  (!) 57 24 97 % 11/04/18 1901 104/57  (!) 58 24 98 % 11/04/18 1855 100/59  (!) 57 24 96 % 11/04/18 1847 97/52  (!) 57 24 96 % 11/04/18 1841 97/55  (!) 58 24 95 % 11/04/18 1832 90/50  (!) 58 24 94 % 11/04/18 1820 (!) 81/44  63 24 94 % 11/04/18 1813 91/50  63 24 93 % 11/04/18 1801 92/55  64 18 94 % 11/04/18 1755 92/50  67 18 95 % 11/04/18 1754   69 18 95 % 11/04/18 1749 93/51  72 (!) 33 91 % 11/04/18 1747 94/55  70 19 94 % 11/04/18 1744 93/53  73 18 94 % 11/04/18 1741 95/53  75 18 93 % 11/04/18 1738 (!) 87/51  75 (!) 117 93 % 11/04/18 1736 91/53  79 18 93 % 11/04/18 1732 94/52  84 18 93 % 11/04/18 1729 90/53  85 18 94 % 11/04/18 1726 (!) 84/51  90 18 93 % 11/04/18 1723 93/54  92 18 91 % 11/04/18 1720 90/51  92 18 91 % 11/04/18 1718 (!) 78/47  93 18 (!) 85 % 11/04/18 1701 108/51  98 17 (!) 88 % 11/04/18 1647 129/60  93 19 (!) 89 % 11/04/18 1632 133/59  95 15 (!) 88 % 11/04/18 1617 139/61  92 20 91 % 11/04/18 1609 154/69  92 26 93 % 11/04/18 1559 152/67  92 18 95 % 11/04/18 1550 154/66 98.4 °F (36.9 °C) 92 18 95 % 11/04/18 1533 150/68  93 17 95 % Physical Exam:   
 
Exam compatible with Brain death. This is the first brain death exam. 
 
Intubated Does not have spontaneous breath when disconnected from ventilator Pupils 4mm bilaterally in midposition and non-reactive Does not open eyes to voice or stimulation No cough No gag No corneals No oculocephalic reflex No vestibulo-ocular reflex No motor response to deep pain Assessment:  
 
48 M comatose with large right BG ICH and now midbrain hemorrhage Exam consistent with brain death. Plan:  
 
-I had a long discussion with entire family following my assessment of patient in the presence of intensivist. I informed family that patient's exam compatible with brain death and therefore no neurosurgical intervention can be offered at this time unfortunately.  This is certainly corroborated by CT findings of midbrain hemorrhage and diffuse cerebral edema which is not salvageable. All questions answered 
-Intensivist will perform second brain death exam later tonight as per hospital protocol. Family offered option of organ donation and will discuss that.

## 2018-11-05 NOTE — PROGRESS NOTES
A follow up visit was made to the patient. Emotional support, spiritual presence and assurance of  
prayer were provided. Several family members in the ICU waiting room were visited and supported. They all appeared to very tired and had been at the hospital throughout the night. Kelley Lyn

## 2018-11-05 NOTE — PROGRESS NOTES
Was able to reach Giovanni Hope, pt's son. He stated to defer health care decisions to [facility]. Verified by [facility], RN.

## 2018-11-05 NOTE — PROGRESS NOTES
A follow up visit was made to the patient. Emotional support, spiritual presence and  
prayer were provided.  provided empathy, comfort, compassion to the family members. Family gathered around the patient in his room and  had a prayer for the patient and his family. Kelley Allanlain

## 2018-11-05 NOTE — PROGRESS NOTES
Called by staff for family support Large family was gathered in room and waiting area Assured family of our continued support as we face this crisis They appeared to be thankful Prayer offered at bedside with family and staff included Talked with evening rosette lewis and she is fully aware of the critical nature of this situation She is calling staff to coordinate how she can be used during the night. Chaplains will follow up with this family through the admission as needed [place], staff Denisa coats [phone], [phone] [facility] Fidel  /   [email or web address]

## 2018-11-05 NOTE — PROGRESS NOTES
A follow up visit was made to the patient. l Grief support, spiritual presence and  
prayer were provided to the family upon the patient's death. Levell Meigs Chaplain

## 2018-11-05 NOTE — PROGRESS NOTES
Bedside report received from Kristin Hassan [place]. Reviewed chart and assumed care of pt. Dual neuro assessment completed. Pt unresponsive to any stimulus. No gag or corneal reflex noted. Pupils fixed at 4 mm. GCS of 3. Intubated with FiO2 at 100% and RR of 24. Lungs clear in bases coarse in upper airway. Moderate amount of thick, yellow secretions suctioned orally. BP requiring levophed support. Dr. Eileen Gerebr at bedside speaking to family about changes in pt condition and plan of care. [facility] representative, [facility], notified and aware. See complete assessment as charted. Lines: peripheral x 2 Drips: levophed at 4 mcg/min Drains: deras for strict I/O, NGT clamped

## 2018-11-05 NOTE — PROGRESS NOTES
Ventilator check complete; patient has a #7.5 ET tube secured at the 25 at the lip. Patient is not sedated. Patient is not able to follow commands. Breath sounds are coarse. Trachea is midline, Negative for subcutaneous air, and chest excursion is symmetric. Patient is also Negative for cyanosis and is Positive for pitting edema. All alarms are set and audible. Resuscitation bag is at the head of the bed. Ventilator Settings Mode FIO2 Rate Tidal Volume Pressure PEEP I:E Ratio PRVC  70 % 20  500 ml     14 cm H20  1:1.3 Peak airway pressure: 28 cm H2O Minute ventilation: 10.2 l/min ABG: Results for Latonia Castelan (MRN [id number]) as of 11/5/2018 08:25 
 11/5/2018 03:43  
pH (POC) 7.530 (H)  
pCO2 (POC) 28.4 (L)  
pO2 (POC) 80 HCO3 (POC) 23.7  
sO2 (POC) 97 Base excess (POC) 2 FIO2 (POC) 70 Patient temp. 98.6 Specimen type (POC) ARTERIAL Set Rate 24 Site LEFT RADIAL Device: VENT Mode Pressure regulate. .. Tidal volume 500 PEEP/CPAP (POC) 14 Allens test (POC) YES Respiratory comment: NurseNotified Inspiratory Time 1.3 Alena Contreras

## 2018-11-05 NOTE — INTERDISCIPLINARY ROUNDS
Interdisciplinary team rounds were held 11/5/2018 with the following team members:Care Management, Nursing, Nurse Practitioner, Nutrition, Occupational Therapy, Palliative Care, Pastoral Care, Pharmacy, Physical Therapy, Physician, Respiratory Therapy and Clinical Coordinator and the patient. Plan of care discussed. See clinical pathway and/or care plan for interventions and desired outcomes.

## 2018-11-05 NOTE — PROGRESS NOTES
Patient asystolic at 4413. Family at bedside aware. Nursing Supervisor notified,  notified and called to bedside, Dr Tj Chandra notified will come to bedside to pronounce.

## 2018-11-05 NOTE — PROGRESS NOTES
Spoke with Dr. Gradie Fleischer regarding line 3% NS is infusing. He states to continue infusion through current line due to pt condition. Pharmacy aware.

## 2018-11-05 NOTE — PROGRESS NOTES
PAtient was pronounced dead at 12.30 pm after he was made DNR and brain death confirmed. Family was at the bedsite but then left.  
 
Ruperto Mishra MD

## 2018-11-05 NOTE — PROGRESS NOTES
A follow up visit was made to the patient. Emotional support, spiritual presence and assurance of  
prayer were provided.  conferred with the patient's nurses about the patient, offered a prayer for the patient and visited again with family members. Maya Lyn

## 2018-11-05 NOTE — PROGRESS NOTES
Bedside shift report given to CASTILLO Lin. Dual neuro assessment done at handoff. Patient's pupils are fixed at 4 cm, no gag, no corneal reflex, patient does not withdraw from pain, patient is not breathing over the vent, vent setting on PRVC at 100% FiO2.

## 2018-11-05 NOTE — PROGRESS NOTES
No healthcare POA on file. PT's sister, Lavell Barrios, states that pt's daughter, Pat Stoner, and son, Henok Posadas, wanted her to make the healthcare decisions for them instead. Spoke with Pat Stoner on phone to verify and she stated her and her brother would like to defer decision making to Lavellcanelo Barrios. Verified by Darwin Sawant RN. Attempted to call Sandy Mckeon but no answer and voicemail is not set up. Will attempt to call later.

## 2018-11-05 NOTE — PROGRESS NOTES
Back from CT. Pt tolerated transfer. HR did drop to 47, SB. BP unchanged. Neuro status unchanged. Back in room HR returned to mid 50's.

## 2018-11-05 NOTE — PROGRESS NOTES
Dr. Liliana Hastings at bedside to perform Brain Death Test. Brain death test positive. Dr Liliana Hastings talked to family about results. Family would like the patient to be put on comfort measures only and to be extubated. Dr Germania Yuen notified and gave verbal orders for comfort measures and extubation.

## 2018-11-05 NOTE — PROGRESS NOTES
Ventilator check complete; patient has a #7.5 ET tube secured at the 25 at the teeth. Patient is sedated. Patient is not able to follow commands. Breath sounds are coarse and diminished. Trachea is midline, Negative for subcutaneous air, and chest excursion is symmetric. Patient is also Negative for cyanosis and is Negative for pitting edema. All alarms are set and audible. Resuscitation bag is at the head of the bed. Ventilator Settings Mode FIO2 Rate Tidal Volume Pressure PEEP I:E Ratio PRVC  100 %   24 breaths/m 500 ml     14 cm H20  1.1:1   
 
Peak airway pressure: 30 cm H2O Minute ventilation: 12.2 l/min ABG: No results for input(s): PH, PCO2, PO2, HCO3 in the last 72 hours. [address]

## 2018-11-06 NOTE — DISCHARGE SUMMARY
Discharge Note Patient: Gregorio Carrillo                 MRN: [id number] YOB: 1965   Age: 48 y.o. Sex: male Admission date:  2018 Discharge date:  2018 Admitting Diagnosis:  Hemorrhagic stroke ([facility] Discharge Diagnoses:   
Hospital Problems as of 2018 Never Reviewed Codes Class Noted - Resolved POA ZENAIDA (acute kidney injury) ([facility].) ICD-10-CM: N17.9 ICD-9-CM: 584.9  11/3/2018 - Present Unknown * (Principal) Hemorrhagic stroke ([facility].) ICD-10-CM: I61.9 ICD-9-CM: 517  2018 - Present Yes Morbid obesity ([facility].) ICD-10-CM: E66.01 
ICD-9-CM: 278.01  2018 - Present Yes HTN (hypertension) ICD-10-CM: I10 
ICD-9-CM: 401.9  2018 - Present Yes HLD (hyperlipidemia) ICD-10-CM: E78.5 ICD-9-CM: 272.4  2018 - Present Yes Hyperglycemia ICD-10-CM: R73.9 ICD-9-CM: 790.29  2018 - Present Unknown Acute respiratory failure with hypoxia and hypercarbia (HCC) ICD-10-CM: J96.01, J96.02 
ICD-9-CM: 518.81  2018 - Present Unknown Consultants:  [facility]phrology Neurology Studies/Procedures: CXR Brain CT with repeat Bronchoscopy Disposition: NA -  Dicharged Condition: NA Hospital course:    Admitted with hypertension, syncope, headache and slurred speech. Head CT showed a 4.5 cm right frontal intraparenchymal hemorrhage. Neurosurgery and neurology were consulted. Patient required intubation. Muliple medications were initially needed to try and control hypertension. He was seen by nephrology who assisted with blood pressure control. He developed a sudden drop in blood pressure and ended up on a pressor. Exam revealed fixed pupils with unresponsiveness. He also experienced worsening oxygenation so emergent bronchoscopy was performed. Repeat CT scan showed increasing cerebral edema loss of gray-white junction increasing right to left shift and the appearance of mid brain hemorrhages consistent with herniation. Neurology met with the family who elected to make patient a DNR with removal of life support. He was extubated and then pronounced by Dr. Eloy Webb at 12:30. Donal Yuan NP November 6, 2018 
 
--Total discharge greater than 30 minutes in duration.